# Patient Record
Sex: MALE | Race: ASIAN | NOT HISPANIC OR LATINO | Employment: FULL TIME | URBAN - METROPOLITAN AREA
[De-identification: names, ages, dates, MRNs, and addresses within clinical notes are randomized per-mention and may not be internally consistent; named-entity substitution may affect disease eponyms.]

---

## 2017-01-24 ENCOUNTER — HOSPITAL ENCOUNTER (OUTPATIENT)
Dept: OTHER | Facility: HOSPITAL | Age: 57
Setting detail: SPECIMEN
Discharge: HOME | End: 2017-01-24
Attending: FAMILY MEDICINE | Admitting: FAMILY MEDICINE

## 2017-01-24 LAB
ALBUMIN SERPL-MCNC: 4.4 G/DL (ref 3.5–4.8)
ALBUMIN/GLOB SERPL: 1.2 {RATIO} (ref 1–1.7)
ALP SERPL-CCNC: 79 IU/L (ref 32–91)
ALT SERPL-CCNC: 49 IU/L (ref 17–63)
ANION GAP SERPL CALC-SCNC: 15.1 MMOL/L (ref 10–20)
AST SERPL-CCNC: 35 IU/L (ref 15–41)
BASOPHILS # BLD AUTO: 0.1 10*3/UL (ref 0–0.2)
BASOPHILS NFR BLD AUTO: 1 % (ref 0–2)
BILIRUB SERPL-MCNC: 0.7 MG/DL (ref 0.3–1.2)
BUN SERPL-MCNC: 13 MG/DL (ref 8–20)
BUN/CREAT SERPL: 10.8 (ref 6.2–20.3)
CALCIUM SERPL-MCNC: 9.5 MG/DL (ref 8.9–10.3)
CHLORIDE SERPL-SCNC: 107 MMOL/L (ref 101–111)
CHOLEST SERPL-MCNC: 191 MG/DL
CHOLEST/HDLC SERPL: 5.4 {RATIO}
CONV CO2: 26 MMOL/L (ref 22–32)
CONV LDL CHOLESTEROL DIRECT: 114 MG/DL (ref 0–100)
CONV TOTAL PROTEIN: 8.1 G/DL (ref 6.1–7.9)
CREAT UR-MCNC: 1.2 MG/DL (ref 0.7–1.2)
DIFFERENTIAL METHOD BLD: (no result)
EOSINOPHIL # BLD AUTO: 0.2 10*3/UL (ref 0–0.3)
EOSINOPHIL # BLD AUTO: 4 % (ref 0–3)
ERYTHROCYTE [DISTWIDTH] IN BLOOD BY AUTOMATED COUNT: 13.4 % (ref 11.5–14.5)
GLOBULIN UR ELPH-MCNC: 3.7 G/DL (ref 2.5–3.8)
GLUCOSE SERPL-MCNC: 120 MG/DL (ref 65–99)
HCT VFR BLD AUTO: 49.5 % (ref 40–54)
HDLC SERPL-MCNC: 36 MG/DL
HGB BLD-MCNC: 16.5 G/DL (ref 14–18)
LDLC/HDLC SERPL: 3.2 {RATIO}
LIPID INTERPRETATION: ABNORMAL
LYMPHOCYTES # BLD AUTO: 1.7 10*3/UL (ref 0.8–4.8)
LYMPHOCYTES NFR BLD AUTO: 27 % (ref 18–42)
MCH RBC QN AUTO: 29.1 PG (ref 26–32)
MCHC RBC AUTO-ENTMCNC: 33.2 G/DL (ref 32–36)
MCV RBC AUTO: 87.5 FL (ref 80–94)
MONOCYTES # BLD AUTO: 0.5 10*3/UL (ref 0.1–1.3)
MONOCYTES NFR BLD AUTO: 7 % (ref 2–11)
NEUTROPHILS # BLD AUTO: 4 10*3/UL (ref 2.3–8.6)
NEUTROPHILS NFR BLD AUTO: 61 % (ref 50–75)
NRBC BLD AUTO-RTO: 0 /100{WBCS}
NRBC/RBC NFR BLD MANUAL: 0 10*3/UL
PLATELET # BLD AUTO: 189 10*3/UL (ref 150–450)
PMV BLD AUTO: 10.1 FL (ref 7.4–10.4)
POTASSIUM SERPL-SCNC: 4.1 MMOL/L (ref 3.6–5.1)
RBC # BLD AUTO: 5.66 10*6/UL (ref 4.6–6)
SODIUM SERPL-SCNC: 144 MMOL/L (ref 136–144)
TRIGL SERPL-MCNC: 226 MG/DL
TSH SERPL-ACNC: 1.83 UIU/ML (ref 0.34–5.6)
VLDLC SERPL CALC-MCNC: 41.9 MG/DL
WBC # BLD AUTO: 6.5 10*3/UL (ref 4.5–11.5)

## 2018-12-24 ENCOUNTER — HOSPITAL ENCOUNTER (OUTPATIENT)
Dept: OTHER | Facility: HOSPITAL | Age: 58
Setting detail: SPECIMEN
Discharge: HOME | End: 2018-12-24
Attending: FAMILY MEDICINE | Admitting: FAMILY MEDICINE

## 2018-12-25 LAB
ALBUMIN SERPL-MCNC: 3.9 G/DL (ref 3.5–4.8)
ALBUMIN/GLOB SERPL: 1.1 {RATIO} (ref 1–1.7)
ALP SERPL-CCNC: 84 IU/L (ref 32–91)
ALT SERPL-CCNC: 65 IU/L (ref 17–63)
ANION GAP SERPL CALC-SCNC: 13.2 MMOL/L (ref 10–20)
AST SERPL-CCNC: 38 IU/L (ref 15–41)
BASOPHILS # BLD AUTO: 0 10*3/UL (ref 0–0.2)
BASOPHILS NFR BLD AUTO: 1 % (ref 0–2)
BILIRUB SERPL-MCNC: 0.5 MG/DL (ref 0.3–1.2)
BUN SERPL-MCNC: 15 MG/DL (ref 8–20)
BUN/CREAT SERPL: 15 (ref 6.2–20.3)
CALCIUM SERPL-MCNC: 9 MG/DL (ref 8.9–10.3)
CHLORIDE SERPL-SCNC: 105 MMOL/L (ref 101–111)
CHOLEST SERPL-MCNC: 176 MG/DL
CHOLEST/HDLC SERPL: 5.1 {RATIO}
CONV CO2: 24 MMOL/L (ref 22–32)
CONV LDL CHOLESTEROL DIRECT: 108 MG/DL (ref 0–100)
CONV TOTAL PROTEIN: 7.3 G/DL (ref 6.1–7.9)
CREAT UR-MCNC: 1 MG/DL (ref 0.7–1.2)
DIFFERENTIAL METHOD BLD: (no result)
EOSINOPHIL # BLD AUTO: 0.1 10*3/UL (ref 0–0.3)
EOSINOPHIL # BLD AUTO: 3 % (ref 0–3)
ERYTHROCYTE [DISTWIDTH] IN BLOOD BY AUTOMATED COUNT: 13.3 % (ref 11.5–14.5)
GLOBULIN UR ELPH-MCNC: 3.4 G/DL (ref 2.5–3.8)
GLUCOSE SERPL-MCNC: 117 MG/DL (ref 65–99)
HCT VFR BLD AUTO: 46.4 % (ref 40–54)
HDLC SERPL-MCNC: 35 MG/DL
HGB BLD-MCNC: 15.5 G/DL (ref 14–18)
LDLC/HDLC SERPL: 3.1 {RATIO}
LIPID INTERPRETATION: ABNORMAL
LYMPHOCYTES # BLD AUTO: 1.3 10*3/UL (ref 0.8–4.8)
LYMPHOCYTES NFR BLD AUTO: 25 % (ref 18–42)
MCH RBC QN AUTO: 29.2 PG (ref 26–32)
MCHC RBC AUTO-ENTMCNC: 33.4 G/DL (ref 32–36)
MCV RBC AUTO: 87.4 FL (ref 80–94)
MONOCYTES # BLD AUTO: 0.4 10*3/UL (ref 0.1–1.3)
MONOCYTES NFR BLD AUTO: 8 % (ref 2–11)
NEUTROPHILS # BLD AUTO: 3.1 10*3/UL (ref 2.3–8.6)
NEUTROPHILS NFR BLD AUTO: 63 % (ref 50–75)
NRBC BLD AUTO-RTO: 0 /100{WBCS}
NRBC/RBC NFR BLD MANUAL: 0 10*3/UL
PLATELET # BLD AUTO: 173 10*3/UL (ref 150–450)
PMV BLD AUTO: 9.2 FL (ref 7.4–10.4)
POTASSIUM SERPL-SCNC: 4.2 MMOL/L (ref 3.6–5.1)
PSA SERPL-MCNC: 0.49 NG/ML (ref 0–4)
RBC # BLD AUTO: 5.31 10*6/UL (ref 4.6–6)
SODIUM SERPL-SCNC: 138 MMOL/L (ref 136–144)
T3FREE SERPL-MCNC: 3.01 PG/ML (ref 2.39–6.79)
T4 SERPL-MCNC: 7.09 UG/DL (ref 6.1–12.2)
TRIGL SERPL-MCNC: 203 MG/DL
TSH SERPL-ACNC: 1.82 UIU/ML (ref 0.34–5.6)
VLDLC SERPL CALC-MCNC: 33.8 MG/DL
WBC # BLD AUTO: 5 10*3/UL (ref 4.5–11.5)

## 2018-12-26 LAB — HBA1C MFR BLD: 6.5 % (ref 0–5.6)

## 2019-01-24 ENCOUNTER — HOSPITAL ENCOUNTER (OUTPATIENT)
Dept: OTHER | Facility: HOSPITAL | Age: 59
Setting detail: SPECIMEN
Discharge: HOME | End: 2019-01-24
Attending: FAMILY MEDICINE | Admitting: FAMILY MEDICINE

## 2019-07-16 RX ORDER — SIMVASTATIN 20 MG
TABLET ORAL
COMMUNITY
Start: 2018-07-19 | End: 2019-07-18 | Stop reason: SDUPTHER

## 2019-07-16 RX ORDER — LISINOPRIL 10 MG/1
TABLET ORAL EVERY 24 HOURS
COMMUNITY
Start: 2017-09-14 | End: 2019-07-18 | Stop reason: SDUPTHER

## 2019-07-17 ENCOUNTER — TELEPHONE (OUTPATIENT)
Dept: FAMILY MEDICINE CLINIC | Facility: CLINIC | Age: 59
End: 2019-07-17

## 2019-07-17 NOTE — TELEPHONE ENCOUNTER
07/17 PT WALKED IN PROVIDED [phone] RX NUMBER FOR YOU TO MAKE SURE HE GETS MEDS THRU [facility]. Mentioned he left message for you to please call him back. Thanks  [email or web address]

## 2019-07-19 ENCOUNTER — TELEPHONE (OUTPATIENT)
Dept: FAMILY MEDICINE CLINIC | Facility: CLINIC | Age: 59
End: 2019-07-19

## 2019-07-19 RX ORDER — SIMVASTATIN 20 MG
20 TABLET ORAL NIGHTLY
Qty: 30 TABLET | Refills: 2 | Status: SHIPPED | OUTPATIENT
Start: 2019-07-19 | End: 2019-07-21 | Stop reason: SDUPTHER

## 2019-07-19 RX ORDER — LISINOPRIL 10 MG/1
10 TABLET ORAL EVERY 24 HOURS
Qty: 30 TABLET | Refills: 2 | Status: SHIPPED | OUTPATIENT
Start: 2019-07-19 | End: 2019-07-21 | Stop reason: SDUPTHER

## 2019-07-21 RX ORDER — LISINOPRIL 10 MG/1
10 TABLET ORAL EVERY 24 HOURS
Qty: 90 TABLET | Refills: 1 | Status: SHIPPED | OUTPATIENT
Start: 2019-07-21 | End: 2019-07-22 | Stop reason: SDUPTHER

## 2019-07-21 RX ORDER — SIMVASTATIN 20 MG
20 TABLET ORAL NIGHTLY
Qty: 90 TABLET | Refills: 1 | Status: SHIPPED | OUTPATIENT
Start: 2019-07-21 | End: 2019-07-22 | Stop reason: SDUPTHER

## 2019-07-22 RX ORDER — SIMVASTATIN 20 MG
20 TABLET ORAL NIGHTLY
Qty: 90 TABLET | Refills: 1 | Status: SHIPPED | OUTPATIENT
Start: 2019-07-22 | End: 2019-07-23 | Stop reason: SDUPTHER

## 2019-07-22 RX ORDER — LISINOPRIL 10 MG/1
10 TABLET ORAL EVERY 24 HOURS
Qty: 90 TABLET | Refills: 1 | Status: SHIPPED | OUTPATIENT
Start: 2019-07-22 | End: 2019-07-23

## 2019-07-23 ENCOUNTER — OFFICE VISIT (OUTPATIENT)
Dept: FAMILY MEDICINE CLINIC | Facility: CLINIC | Age: 59
End: 2019-07-23

## 2019-07-23 VITALS
SYSTOLIC BLOOD PRESSURE: 142 MMHG | HEART RATE: 59 BPM | WEIGHT: 242.6 LBS | OXYGEN SATURATION: 97 % | DIASTOLIC BLOOD PRESSURE: 90 MMHG | BODY MASS INDEX: 35.93 KG/M2 | HEIGHT: 69 IN

## 2019-07-23 DIAGNOSIS — I10 ESSENTIAL HYPERTENSION: Primary | ICD-10-CM

## 2019-07-23 DIAGNOSIS — R73.09 ABNORMAL GLUCOSE LEVEL: ICD-10-CM

## 2019-07-23 DIAGNOSIS — E78.2 MULTIPLE-TYPE HYPERLIPIDEMIA: ICD-10-CM

## 2019-07-23 PROBLEM — N40.0 BENIGN PROSTATIC HYPERPLASIA: Status: ACTIVE | Noted: 2018-12-24

## 2019-07-23 PROBLEM — D22.9 NEVUS, ATYPICAL: Status: ACTIVE | Noted: 2018-12-24

## 2019-07-23 PROCEDURE — 99213 OFFICE O/P EST LOW 20 MIN: CPT | Performed by: FAMILY MEDICINE

## 2019-07-23 RX ORDER — SIMVASTATIN 20 MG
20 TABLET ORAL NIGHTLY
Qty: 90 TABLET | Refills: 1 | Status: SHIPPED | OUTPATIENT
Start: 2019-07-23 | End: 2020-02-10 | Stop reason: SDUPTHER

## 2019-07-23 RX ORDER — LISINOPRIL 20 MG/1
TABLET ORAL
Qty: 90 TABLET | Refills: 1 | Status: SHIPPED | OUTPATIENT
Start: 2019-07-23 | End: 2020-02-10 | Stop reason: SDUPTHER

## 2019-07-23 NOTE — PATIENT INSTRUCTIONS
Will increase Lisinopril to 20 mg q d w periodic BP Ck.  Cont Simvastatin 20 mg q d.  Add Cinnamon 1000 mg bid w meals and periodic BS Ck.  Cont HBP/HLD/DM Diet w Exercise and Wt Loss.  PCP F/U prn or in 6 mo. for Annual Exam and Labs.
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
## 2019-07-25 RX ORDER — LISINOPRIL 20 MG/1
TABLET ORAL
Qty: 90 TABLET | Refills: 1 | Status: CANCELLED | OUTPATIENT
Start: 2019-07-25

## 2019-07-26 ENCOUNTER — TELEPHONE (OUTPATIENT)
Dept: FAMILY MEDICINE CLINIC | Facility: CLINIC | Age: 59
End: 2019-07-26

## 2019-07-26 NOTE — TELEPHONE ENCOUNTER
Dose was increased from 10 mg to 20 mg. New eRx sent and PW filled out and signed for the 20 mg. Thanks.

## 2020-02-11 RX ORDER — SIMVASTATIN 20 MG
20 TABLET ORAL NIGHTLY
Qty: 90 TABLET | Refills: 0 | Status: SHIPPED | OUTPATIENT
Start: 2020-02-11 | End: 2020-04-23 | Stop reason: SDUPTHER

## 2020-02-11 RX ORDER — LISINOPRIL 20 MG/1
TABLET ORAL
Qty: 90 TABLET | Refills: 0 | Status: SHIPPED | OUTPATIENT
Start: 2020-02-11 | End: 2020-04-23 | Stop reason: SDUPTHER

## 2020-04-27 ENCOUNTER — TELEPHONE (OUTPATIENT)
Dept: FAMILY MEDICINE CLINIC | Facility: CLINIC | Age: 60
End: 2020-04-27

## 2020-04-27 RX ORDER — SIMVASTATIN 20 MG
20 TABLET ORAL NIGHTLY
Qty: 90 TABLET | Refills: 0 | Status: SHIPPED | OUTPATIENT
Start: 2020-04-27 | End: 2020-07-28 | Stop reason: SDUPTHER

## 2020-04-27 RX ORDER — LISINOPRIL 20 MG/1
TABLET ORAL
Qty: 90 TABLET | Refills: 0 | Status: SHIPPED | OUTPATIENT
Start: 2020-04-27 | End: 2020-04-30 | Stop reason: SDUPTHER

## 2020-04-27 NOTE — TELEPHONE ENCOUNTER
All strengths of lisinopril are backordered at patient's pharmacy.  Is there something else you can prescribe for the patient?

## 2020-04-28 NOTE — TELEPHONE ENCOUNTER
Can you please call patient to establish care with Yadi?  If he has an appt, can you move it up?  We can't refill his medication until he is seen.  Thank you.

## 2020-04-28 NOTE — TELEPHONE ENCOUNTER
I have never seen pt, he should try another pharmacy. No change until I see pt and know how bp is running.

## 2020-04-29 NOTE — TELEPHONE ENCOUNTER
Moved pat to tomorrow, but had to leave .  Ask him on  to call and confirm this will work.  Thanks bm

## 2020-04-30 ENCOUNTER — OFFICE VISIT (OUTPATIENT)
Dept: FAMILY MEDICINE CLINIC | Facility: CLINIC | Age: 60
End: 2020-04-30

## 2020-04-30 VITALS
WEIGHT: 243.2 LBS | HEART RATE: 74 BPM | OXYGEN SATURATION: 96 % | SYSTOLIC BLOOD PRESSURE: 142 MMHG | BODY MASS INDEX: 36.02 KG/M2 | TEMPERATURE: 98.2 F | DIASTOLIC BLOOD PRESSURE: 94 MMHG | HEIGHT: 69 IN

## 2020-04-30 DIAGNOSIS — I10 ESSENTIAL HYPERTENSION: ICD-10-CM

## 2020-04-30 DIAGNOSIS — Z00.00 PREVENTATIVE HEALTH CARE: ICD-10-CM

## 2020-04-30 DIAGNOSIS — Z12.5 PROSTATE CANCER SCREENING: ICD-10-CM

## 2020-04-30 DIAGNOSIS — Z23 NEED FOR TDAP VACCINATION: ICD-10-CM

## 2020-04-30 DIAGNOSIS — R53.83 OTHER FATIGUE: ICD-10-CM

## 2020-04-30 DIAGNOSIS — R06.83 SNORING: Primary | ICD-10-CM

## 2020-04-30 DIAGNOSIS — E78.2 MIXED HYPERLIPIDEMIA: ICD-10-CM

## 2020-04-30 LAB
DEPRECATED RDW RBC AUTO: 42.4 FL (ref 37–54)
ERYTHROCYTE [DISTWIDTH] IN BLOOD BY AUTOMATED COUNT: 13 % (ref 12.3–15.4)
HCT VFR BLD AUTO: 44.9 % (ref 37.5–51)
HGB BLD-MCNC: 15 G/DL (ref 13–17.7)
MCH RBC QN AUTO: 29.4 PG (ref 26.6–33)
MCHC RBC AUTO-ENTMCNC: 33.4 G/DL (ref 31.5–35.7)
MCV RBC AUTO: 88 FL (ref 79–97)
PLATELET # BLD AUTO: 174 10*3/MM3 (ref 140–450)
PMV BLD AUTO: 12 FL (ref 6–12)
RBC # BLD AUTO: 5.1 10*6/MM3 (ref 4.14–5.8)
WBC NRBC COR # BLD: 6.75 10*3/MM3 (ref 3.4–10.8)

## 2020-04-30 PROCEDURE — 85027 COMPLETE CBC AUTOMATED: CPT | Performed by: NURSE PRACTITIONER

## 2020-04-30 PROCEDURE — 84443 ASSAY THYROID STIM HORMONE: CPT | Performed by: NURSE PRACTITIONER

## 2020-04-30 PROCEDURE — 80061 LIPID PANEL: CPT | Performed by: NURSE PRACTITIONER

## 2020-04-30 PROCEDURE — 80053 COMPREHEN METABOLIC PANEL: CPT | Performed by: NURSE PRACTITIONER

## 2020-04-30 PROCEDURE — 90715 TDAP VACCINE 7 YRS/> IM: CPT | Performed by: NURSE PRACTITIONER

## 2020-04-30 PROCEDURE — 90471 IMMUNIZATION ADMIN: CPT | Performed by: NURSE PRACTITIONER

## 2020-04-30 PROCEDURE — G0103 PSA SCREENING: HCPCS | Performed by: NURSE PRACTITIONER

## 2020-04-30 PROCEDURE — 99214 OFFICE O/P EST MOD 30 MIN: CPT | Performed by: NURSE PRACTITIONER

## 2020-04-30 PROCEDURE — 83036 HEMOGLOBIN GLYCOSYLATED A1C: CPT | Performed by: NURSE PRACTITIONER

## 2020-04-30 PROCEDURE — 83721 ASSAY OF BLOOD LIPOPROTEIN: CPT | Performed by: NURSE PRACTITIONER

## 2020-04-30 RX ORDER — LISINOPRIL 20 MG/1
TABLET ORAL
Qty: 90 TABLET | Refills: 0 | Status: SHIPPED | OUTPATIENT
Start: 2020-04-30 | End: 2020-10-30 | Stop reason: SDUPTHER

## 2020-04-30 NOTE — PROGRESS NOTES
Chief Complaint   Patient presents with   • Establish Care     Had colonoscopy 4-5 years ago, need tdap because he stepped on thumb tack, wants to discuss sleep apnea test       HPI    HTN, borderline bp today, feels stable on meds and takes as directed, denies chest pain, headache, shortness of air, palpitations and swelling of extremities.     Hyperlipidemia, The patient denies muscle aches, constipation, diarrhea, GI upset and fatigue.  Patient denies  chest pain/pressure, exercise intolerance, dyspnea, palpitations, syncope and pedal edema.      Snoring, concerned with sleep apnea, pt reports severe daytime fatigue, takes a nap almost daily, witnessed apnea reported by spouse, pt startles, and starts breathing again    History reviewed. No pertinent past medical history.  History reviewed. No pertinent surgical history.  History reviewed. No pertinent family history.  Social History     Tobacco Use   • Smoking status: Never Smoker   Substance Use Topics   • Alcohol use: Not on file         Current Outpatient Medications:   •  lisinopril (PRINIVIL,ZESTRIL) 20 MG tablet, Take one tablet by mouth daily, Disp: 90 tablet, Rfl: 0  •  simvastatin (ZOCOR) 20 MG tablet, Take 1 tablet by mouth Every Night., Disp: 90 tablet, Rfl: 0    The following portions of the patient's history were reviewed and updated as appropriate: allergies, current medications, past family history, past medical history, past social history, past surgical history and problem list.    Review of Systems   Constitutional: Negative for chills, fatigue and fever.   HENT: Negative for dental problem, ear pain, sinus pressure and sore throat.    Eyes: Negative for visual disturbance.   Respiratory: Negative for cough, shortness of breath and wheezing.    Gastrointestinal: Negative for abdominal pain, blood in stool, constipation, diarrhea, nausea, vomiting and GERD.   Genitourinary: Negative for difficulty urinating, frequency, urgency and urinary  "incontinence.   Musculoskeletal: Negative for arthralgias, back pain, gait problem, joint swelling, myalgias and neck pain.   Skin: Negative for dry skin, pallor and rash.   Neurological: Negative for dizziness, seizures, speech difficulty and weakness.   Hematological: Negative for adenopathy.   Psychiatric/Behavioral: Negative for sleep disturbance, depressed mood and stress. The patient is not nervous/anxious.         Vitals:    04/30/20 1506   BP: 142/94   BP Location: Right arm   Patient Position: Sitting   Cuff Size: Large Adult   Pulse: 74   Temp: 98.2 °F (36.8 °C)   SpO2: 96%   Weight: 110 kg (243 lb 3.2 oz)   Height: 175.3 cm (69.02\")     Body mass index is 35.9 kg/m².     Physical Exam   Constitutional: He is oriented to person, place, and time. He appears well-developed and well-nourished. No distress.   HENT:   Head: Normocephalic and atraumatic.   Eyes: Pupils are equal, round, and reactive to light.   Neck: Normal range of motion. No thyromegaly present.   Cardiovascular: Normal rate, regular rhythm, normal heart sounds and intact distal pulses.   Pulmonary/Chest: Effort normal and breath sounds normal. No respiratory distress.   Abdominal: Soft. Bowel sounds are normal. He exhibits no distension. There is no tenderness.   Musculoskeletal: Normal range of motion.   Neurological: He is alert and oriented to person, place, and time.   Skin: Skin is warm and dry. He is not diaphoretic. No erythema.   Psychiatric: He has a normal mood and affect. His behavior is normal. Judgment and thought content normal.   Nursing note and vitals reviewed.       No visits with results within 7 Day(s) from this visit.   Latest known visit with results is:   No results found for any previous visit.       Diagnoses and all orders for this visit:    1. Snoring (Primary)  -     Home Sleep Study; Future  Check home sleep study, rule out obstructive sleep apnea.   will notify patient results and order CPAP if indicated.    2. " Essential hypertension  -     Lipid Panel  -     Comprehensive Metabolic Panel  -     CBC (No Diff)  -Borderline elevated, refill lisinopril for this 90 days to local [facility] due to [facility] order [facility] having lisinopril on back order.  -Discussed with patient to check blood pressure occasionally outside of the office and notify if consistently greater than 150 SBP    3. Preventative health care  -     TSH  -     Hemoglobin A1c     Colonoscopy up-to-date, due 2025  Flu vaccine: Up-to-date yearly  Discussed/recommended shingles vaccines  Update Tdap today    4. Prostate cancer screening  -     PSA Screen    5. Other fatigue  Check home sleep study, rule out obstructive sleep apnea    6. Need for Tdap vaccination  Stepped on thumbtack, given tdap today    7. Mixed hyperlipidemia  Continue, recently refilled simvastatin to [facility].  Labs today and will notify patient results    Other orders  -     lisinopril (PRINIVIL,ZESTRIL) 20 MG tablet; Take one tablet by mouth daily  Dispense: 90 tablet; Refill: 0         Collect labs as above, notify patient results once received   Refill simvastatin and lisinopril next time to [facility] mail order pharmacy  We will plan to see patient back in 6 months or earlier if needed

## 2020-05-01 LAB
ALBUMIN SERPL-MCNC: 4.2 G/DL (ref 3.5–5.2)
ALBUMIN/GLOB SERPL: 1.2 G/DL
ALP SERPL-CCNC: 98 U/L (ref 39–117)
ALT SERPL W P-5'-P-CCNC: 119 U/L (ref 1–41)
ANION GAP SERPL CALCULATED.3IONS-SCNC: 14.2 MMOL/L (ref 5–15)
ARTICHOKE IGE QN: 104 MG/DL (ref 0–100)
AST SERPL-CCNC: 88 U/L (ref 1–40)
BILIRUB SERPL-MCNC: 0.4 MG/DL (ref 0.2–1.2)
BUN BLD-MCNC: 16 MG/DL (ref 8–23)
BUN/CREAT SERPL: 17.8 (ref 7–25)
CALCIUM SPEC-SCNC: 9.5 MG/DL (ref 8.6–10.5)
CHLORIDE SERPL-SCNC: 101 MMOL/L (ref 98–107)
CHOLEST SERPL-MCNC: 185 MG/DL (ref 0–200)
CO2 SERPL-SCNC: 22.8 MMOL/L (ref 22–29)
CREAT BLD-MCNC: 0.9 MG/DL (ref 0.76–1.27)
GFR SERPL CREATININE-BSD FRML MDRD: 104 ML/MIN/1.73
GFR SERPL CREATININE-BSD FRML MDRD: 86 ML/MIN/1.73
GLOBULIN UR ELPH-MCNC: 3.6 GM/DL
GLUCOSE BLD-MCNC: 238 MG/DL (ref 65–99)
HBA1C MFR BLD: 7.8 % (ref 3.5–5.6)
HDLC SERPL-MCNC: 30 MG/DL (ref 40–60)
LDLC SERPL CALC-MCNC: ABNORMAL MG/DL
LDLC/HDLC SERPL: ABNORMAL {RATIO}
POTASSIUM BLD-SCNC: 4 MMOL/L (ref 3.5–5.2)
PROT SERPL-MCNC: 7.8 G/DL (ref 6–8.5)
PSA SERPL-MCNC: 0.36 NG/ML (ref 0–4)
SODIUM BLD-SCNC: 138 MMOL/L (ref 136–145)
TRIGL SERPL-MCNC: 478 MG/DL (ref 0–150)
TSH SERPL DL<=0.05 MIU/L-ACNC: 1.85 UIU/ML (ref 0.27–4.2)
VLDLC SERPL-MCNC: ABNORMAL MG/DL

## 2020-07-28 RX ORDER — SIMVASTATIN 20 MG
20 TABLET ORAL NIGHTLY
Qty: 90 TABLET | Refills: 0 | Status: SHIPPED | OUTPATIENT
Start: 2020-07-28 | End: 2020-10-26

## 2020-10-26 RX ORDER — SIMVASTATIN 20 MG
TABLET ORAL
Qty: 90 TABLET | Refills: 0 | Status: SHIPPED | OUTPATIENT
Start: 2020-10-26 | End: 2021-01-25

## 2020-10-26 NOTE — TELEPHONE ENCOUNTER
Patient has not followed up to discuss and/or start medication for hemoglobin A1c of 7.8, please schedule appointment and repeat diabetic panel fasting 1 week prior to that appointment.

## 2020-10-30 ENCOUNTER — OFFICE VISIT (OUTPATIENT)
Dept: FAMILY MEDICINE CLINIC | Facility: CLINIC | Age: 60
End: 2020-10-30

## 2020-10-30 VITALS
OXYGEN SATURATION: 97 % | HEART RATE: 64 BPM | RESPIRATION RATE: 16 BRPM | TEMPERATURE: 97.3 F | WEIGHT: 244.8 LBS | DIASTOLIC BLOOD PRESSURE: 104 MMHG | SYSTOLIC BLOOD PRESSURE: 168 MMHG | HEIGHT: 69 IN | BODY MASS INDEX: 36.26 KG/M2

## 2020-10-30 DIAGNOSIS — I10 ESSENTIAL HYPERTENSION: Primary | ICD-10-CM

## 2020-10-30 DIAGNOSIS — E78.2 MULTIPLE-TYPE HYPERLIPIDEMIA: ICD-10-CM

## 2020-10-30 PROCEDURE — 99213 OFFICE O/P EST LOW 20 MIN: CPT | Performed by: NURSE PRACTITIONER

## 2020-10-30 RX ORDER — LISINOPRIL 40 MG/1
TABLET ORAL
Qty: 90 TABLET | Refills: 1 | Status: SHIPPED | OUTPATIENT
Start: 2020-10-30 | End: 2021-05-14 | Stop reason: SDUPTHER

## 2020-10-30 NOTE — PATIENT INSTRUCTIONS
"1. Check blood pressure 2-3 times weekly and record  2. Call office if greater than 160/90      Hypertension, Adult  High blood pressure (hypertension) is when the force of blood pumping through the arteries is too strong. The arteries are the blood vessels that carry blood from the heart throughout the body. Hypertension forces the heart to work harder to pump blood and may cause arteries to become narrow or stiff. Untreated or uncontrolled hypertension can cause a heart attack, heart failure, a stroke, kidney disease, and other problems.  A blood pressure reading consists of a higher number over a lower number. Ideally, your blood pressure should be below 120/80. The first (\"top\") number is called the systolic pressure. It is a measure of the pressure in your arteries as your heart beats. The second (\"bottom\") number is called the diastolic pressure. It is a measure of the pressure in your arteries as the heart relaxes.  What are the causes?  The exact cause of this condition is not known. There are some conditions that result in or are related to high blood pressure.  What increases the risk?  Some risk factors for high blood pressure are under your control. The following factors may make you more likely to develop this condition:  · Smoking.  · Having type 2 diabetes mellitus, high cholesterol, or both.  · Not getting enough exercise or physical activity.  · Being overweight.  · Having too much fat, sugar, calories, or salt (sodium) in your diet.  · Drinking too much alcohol.  Some risk factors for high blood pressure may be difficult or impossible to change. Some of these factors include:  · Having chronic kidney disease.  · Having a family history of high blood pressure.  · Age. Risk increases with age.  · Race. You may be at higher risk if you are .  · Gender. Men are at higher risk than women before age 45. After age 65, women are at higher risk than men.  · Having obstructive sleep " apnea.  · Stress.  What are the signs or symptoms?  High blood pressure may not cause symptoms. Very high blood pressure (hypertensive crisis) may cause:  · Headache.  · Anxiety.  · Shortness of breath.  · Nosebleed.  · Nausea and vomiting.  · Vision changes.  · Severe chest pain.  · Seizures.  How is this diagnosed?  This condition is diagnosed by measuring your blood pressure while you are seated, with your arm resting on a flat surface, your legs uncrossed, and your feet flat on the floor. The cuff of the blood pressure monitor will be placed directly against the skin of your upper arm at the level of your heart. It should be measured at least twice using the same arm. Certain conditions can cause a difference in blood pressure between your right and left arms.  Certain factors can cause blood pressure readings to be lower or higher than normal for a short period of time:  · When your blood pressure is higher when you are in a health care provider's office than when you are at home, this is called white coat hypertension. Most people with this condition do not need medicines.  · When your blood pressure is higher at home than when you are in a health care provider's office, this is called masked hypertension. Most people with this condition may need medicines to control blood pressure.  If you have a high blood pressure reading during one visit or you have normal blood pressure with other risk factors, you may be asked to:  · Return on a different day to have your blood pressure checked again.  · Monitor your blood pressure at home for 1 week or longer.  If you are diagnosed with hypertension, you may have other blood or imaging tests to help your health care provider understand your overall risk for other conditions.  How is this treated?  This condition is treated by making healthy lifestyle changes, such as eating healthy foods, exercising more, and reducing your alcohol intake. Your health care provider may  prescribe medicine if lifestyle changes are not enough to get your blood pressure under control, and if:  · Your systolic blood pressure is above 130.  · Your diastolic blood pressure is above 80.  Your personal target blood pressure may vary depending on your medical conditions, your age, and other factors.  Follow these instructions at home:  Eating and drinking    · Eat a diet that is high in fiber and potassium, and low in sodium, added sugar, and fat. An example eating plan is called the DASH (Dietary Approaches to Stop Hypertension) diet. To eat this way:  ? Eat plenty of fresh fruits and vegetables. Try to fill one half of your plate at each meal with fruits and vegetables.  ? Eat whole grains, such as whole-wheat pasta, brown rice, or whole-grain bread. Fill about one fourth of your plate with whole grains.  ? Eat or drink low-fat dairy products, such as skim milk or low-fat yogurt.  ? Avoid fatty cuts of meat, processed or cured meats, and poultry with skin. Fill about one fourth of your plate with lean proteins, such as fish, chicken without skin, beans, eggs, or tofu.  ? Avoid pre-made and processed foods. These tend to be higher in sodium, added sugar, and fat.  · Reduce your daily sodium intake. Most people with hypertension should eat less than 1,500 mg of sodium a day.  · Do not drink alcohol if:  ? Your health care provider tells you not to drink.  ? You are pregnant, may be pregnant, or are planning to become pregnant.  · If you drink alcohol:  ? Limit how much you use to:  § 0-1 drink a day for women.  § 0-2 drinks a day for men.  ? Be aware of how much alcohol is in your drink. In the [place] one drink equals one 12 oz bottle of beer (355 mL), one 5 oz glass of wine (148 mL), or one 1½ oz glass of hard liquor (44 mL).  Lifestyle    · Work with your health care provider to maintain a healthy body weight or to lose weight. Ask what an ideal weight is for you.  · Get at least 30 minutes of exercise  most days of the week. Activities may include walking, swimming, or biking.  · Include exercise to strengthen your muscles (resistance exercise), such as Pilates or lifting weights, as part of your weekly exercise routine. Try to do these types of exercises for 30 minutes at least 3 days a week.  · Do not use any products that contain nicotine or tobacco, such as cigarettes, e-cigarettes, and chewing tobacco. If you need help quitting, ask your health care provider.  · Monitor your blood pressure at home as told by your health care provider.  · Keep all follow-up visits as told by your health care provider. This is important.  Medicines  · Take over-the-counter and prescription medicines only as told by your health care provider. Follow directions carefully. Blood pressure medicines must be taken as prescribed.  · Do not skip doses of blood pressure medicine. Doing this puts you at risk for problems and can make the medicine less effective.  · Ask your health care provider about side effects or reactions to medicines that you should watch for.  Contact a health care provider if you:  · Think you are having a reaction to a medicine you are taking.  · Have headaches that keep coming back (recurring).  · Feel dizzy.  · Have swelling in your ankles.  · Have trouble with your vision.  Get help right away if you:  · Develop a severe headache or confusion.  · Have unusual weakness or numbness.  · Feel faint.  · Have severe pain in your chest or abdomen.  · Vomit repeatedly.  · Have trouble breathing.  Summary  · Hypertension is when the force of blood pumping through your arteries is too strong. If this condition is not controlled, it may put you at risk for serious complications.  · Your personal target blood pressure may vary depending on your medical conditions, your age, and other factors. For most people, a normal blood pressure is less than 120/80.  · Hypertension is treated with lifestyle changes, medicines, or a  combination of both. Lifestyle changes include losing weight, eating a healthy, low-sodium diet, exercising more, and limiting alcohol.  This information is not intended to replace advice given to you by your health care provider. Make sure you discuss any questions you have with your health care provider.  Document Released: 12/18/2006 Document Revised: 08/28/2019 Document Reviewed: 08/28/2019  Elsevier Patient Education © 2020 [employer]

## 2020-10-30 NOTE — ASSESSMENT & PLAN NOTE
1.  Patient has been out of lisinopril for 1 month but reports that even with the lisinopril 20 mg, BP was elevated  2.  Increase lisinopril to 40 mg daily  3.  Patient to follow no added salt diet  4.  Patient to check blood pressure 2-3 times weekly and record.  Call office if greater than 160/90 or if chest pain, headaches, blurred vision develop  5.  Patient to return in 1 month

## 2020-10-30 NOTE — PROGRESS NOTES
Chief Complaint   Patient presents with   • Hypertension   • Follow-up       Hypertension  This is a chronic problem. The current episode started more than 1 year ago. The problem is uncontrolled. Associated symptoms include headaches. Pertinent negatives include no blurred vision, chest pain or shortness of breath. Risk factors for coronary artery disease include obesity, male gender and dyslipidemia. Current antihypertension treatment includes ACE inhibitors. The current treatment provides mild improvement. Compliance problems: Patient has been out of medication x 1 month.    Hyperlipidemia  This is a chronic problem. The problem is uncontrolled. Exacerbating diseases include obesity. Pertinent negatives include no chest pain or shortness of breath. Current antihyperlipidemic treatment includes statins. The current treatment provides mild improvement of lipids. There are no compliance problems.  Risk factors for coronary artery disease include obesity and male sex.       The following portions of the patient's history were reviewed and updated as appropriate: allergies, current medications, past family history, past medical history, past social history, past surgical history and problem list.    History reviewed. No pertinent past medical history.  History reviewed. No pertinent surgical history.  History reviewed. No pertinent family history.  Social History     Tobacco Use   • Smoking status: Never Smoker   • Smokeless tobacco: Never Used   Substance Use Topics   • Alcohol use: Not on file         Current Outpatient Medications:   •  lisinopril (PRINIVIL,ZESTRIL) 40 MG tablet, Take one tablet by mouth daily, Disp: 90 tablet, Rfl: 1  •  simvastatin (ZOCOR) 20 MG tablet, TAKE 1 TABLET EVERY NIGHT, Disp: 90 tablet, Rfl: 0        Review of Systems   Constitutional: Negative for fever and unexpected weight loss.   HENT: Negative for sore throat.    Eyes: Negative for blurred vision.   Respiratory: Negative for cough,  "shortness of breath and wheezing.    Cardiovascular: Negative for chest pain and leg swelling.   Gastrointestinal: Negative for constipation, diarrhea, nausea and vomiting.   Genitourinary: Negative for dysuria, frequency and urgency.   Musculoskeletal: Negative for arthralgias and gait problem.   Skin: Negative for rash and skin lesions.   Neurological: Positive for headache. Negative for dizziness and weakness.   Psychiatric/Behavioral: Negative for depressed mood. The patient is not nervous/anxious.        Vitals:    10/30/20 1055 10/30/20 1112   BP: (!) 156/102 (!) 168/104   BP Location: Left arm    Patient Position: Sitting    Cuff Size: Large Adult    Pulse: 64    Resp: 16    Temp: 97.3 °F (36.3 °C)    TempSrc: Skin    SpO2: 97%    Weight: 111 kg (244 lb 12.8 oz)    Height: 175.3 cm (69\")      Body mass index is 36.15 kg/m².      PHQ-9 Depression Screening  Little interest or pleasure in doing things? 0   Feeling down, depressed, or hopeless? 0   Trouble falling or staying asleep, or sleeping too much? 0   Feeling tired or having little energy? 2   Poor appetite or overeating? 0   Feeling bad about yourself - or that you are a failure or have let yourself or your family down? 0   Trouble concentrating on things, such as reading the newspaper or watching television? 0   Moving or speaking so slowly that other people could have noticed? Or the opposite - being so fidgety or restless that you have been moving around a lot more than usual? 0   Thoughts that you would be better off dead, or of hurting yourself in some way? 0   PHQ-9 Total Score 2   If you checked off any problems, how difficult have these problems made it for you to do your work, take care of things at home, or get along with other people? @[facility](210  [id number])@        Physical Exam  Constitutional:       Appearance: Normal appearance.   HENT:      Head: Normocephalic.   Neck:      Musculoskeletal: Neck supple.   Cardiovascular:      Rate and " Rhythm: Normal rate and regular rhythm.   Pulmonary:      Effort: Pulmonary effort is normal.      Breath sounds: Normal breath sounds.   Abdominal:      General: Bowel sounds are normal.      Palpations: Abdomen is soft.   Musculoskeletal: Normal range of motion.      Right lower leg: No edema.      Left lower leg: No edema.   Skin:     General: Skin is warm and dry.   Neurological:      Mental Status: He is alert and oriented to person, place, and time.      Gait: Gait is intact.   Psychiatric:         Attention and Perception: Attention normal.         Mood and Affect: Mood normal.         Speech: Speech normal.         No visits with results within 7 Day(s) from this visit.   Latest known visit with results is:   Office Visit on 04/30/2020   Component Date Value Ref Range Status   • Total Cholesterol 04/30/2020 185  0 - 200 mg/dL Final   • Triglycerides 04/30/2020 478* 0 - 150 mg/dL Final   • HDL Cholesterol 04/30/2020 30* 40 - 60 mg/dL Final   • LDL Cholesterol  04/30/2020    Final    Unable to calculate   • VLDL Cholesterol 04/30/2020    Final    Unable to calculate   • LDL/HDL Ratio 04/30/2020    Final    Unable to calculate   • Glucose 04/30/2020 238* 65 - 99 mg/dL Final   • BUN 04/30/2020 16  8 - 23 mg/dL Final   • Creatinine 04/30/2020 0.90  0.76 - 1.27 mg/dL Final   • Sodium 04/30/2020 138  136 - 145 mmol/L Final   • Potassium 04/30/2020 4.0  3.5 - 5.2 mmol/L Final   • Chloride 04/30/2020 101  98 - 107 mmol/L Final   • CO2 04/30/2020 22.8  22.0 - 29.0 mmol/L Final   • Calcium 04/30/2020 9.5  8.6 - 10.5 mg/dL Final   • Total Protein 04/30/2020 7.8  6.0 - 8.5 g/dL Final   • Albumin 04/30/2020 4.20  3.50 - 5.20 g/dL Final   • ALT (SGPT) 04/30/2020 119* 1 - 41 U/L Final   • AST (SGOT) 04/30/2020 88* 1 - 40 U/L Final   • Alkaline Phosphatase 04/30/2020 98  39 - 117 U/L Final   • Total Bilirubin 04/30/2020 0.4  0.2 - 1.2 mg/dL Final   • eGFR Non African Amer 04/30/2020 86  >60 mL/min/1.73 Final   • eGFR    Amer 04/30/2020 104  >60 mL/min/1.73 Final   • Globulin 04/30/2020 3.6  gm/dL Final   • A/G Ratio 04/30/2020 1.2  g/dL Final   • BUN/Creatinine Ratio 04/30/2020 17.8  7.0 - 25.0 Final   • Anion Gap 04/30/2020 14.2  5.0 - 15.0 mmol/L Final   • WBC 04/30/2020 6.75  3.40 - 10.80 10*3/mm3 Final   • RBC 04/30/2020 5.10  4.14 - 5.80 10*6/mm3 Final   • Hemoglobin 04/30/2020 15.0  13.0 - 17.7 g/dL Final   • Hematocrit 04/30/2020 44.9  37.5 - 51.0 % Final   • MCV 04/30/2020 88.0  79.0 - 97.0 fL Final   • MCH 04/30/2020 29.4  26.6 - 33.0 pg Final   • MCHC 04/30/2020 33.4  31.5 - 35.7 g/dL Final   • RDW 04/30/2020 13.0  12.3 - 15.4 % Final   • RDW-SD 04/30/2020 42.4  37.0 - 54.0 fl Final   • MPV 04/30/2020 12.0  6.0 - 12.0 fL Final   • Platelets 04/30/2020 174  140 - 450 10*3/mm3 Final   • TSH 04/30/2020 1.850  0.270 - 4.200 uIU/mL Final    TSH results may be falsely decreased if patient taking Biotin.   • PSA 04/30/2020 0.360  0.000 - 4.000 ng/mL Final   • Hemoglobin A1C 04/30/2020 7.8* 3.5 - 5.6 % Final   • LDL Cholesterol  04/30/2020 104* 0 - 100 mg/dL Final       Problem List Items Addressed This Visit        Cardiovascular and Mediastinum    Hypertension - Primary    Current Assessment & Plan     1.  Patient has been out of lisinopril for 1 month but reports that even with the lisinopril 20 mg, BP was elevated  2.  Increase lisinopril to 40 mg daily  3.  Patient to follow no added salt diet  4.  Patient to check blood pressure 2-3 times weekly and record.  Call office if greater than 160/90 or if chest pain, headaches, blurred vision develop  5.  Patient to return in 1 month         Relevant Medications    lisinopril (PRINIVIL,ZESTRIL) 40 MG tablet    Multiple-type hyperlipidemia    Current Assessment & Plan     1.  Continue Zocor 20 mg daily  2.  Start fish oil 1 g daily

## 2020-11-25 ENCOUNTER — OFFICE VISIT (OUTPATIENT)
Dept: FAMILY MEDICINE CLINIC | Facility: CLINIC | Age: 60
End: 2020-11-25

## 2020-11-25 VITALS
SYSTOLIC BLOOD PRESSURE: 157 MMHG | HEIGHT: 69 IN | OXYGEN SATURATION: 96 % | TEMPERATURE: 97.5 F | HEART RATE: 64 BPM | BODY MASS INDEX: 36.97 KG/M2 | WEIGHT: 249.6 LBS | DIASTOLIC BLOOD PRESSURE: 104 MMHG

## 2020-11-25 DIAGNOSIS — E78.2 MULTIPLE-TYPE HYPERLIPIDEMIA: ICD-10-CM

## 2020-11-25 DIAGNOSIS — I10 ESSENTIAL HYPERTENSION: Primary | ICD-10-CM

## 2020-11-25 DIAGNOSIS — Z00.00 PREVENTATIVE HEALTH CARE: ICD-10-CM

## 2020-11-25 PROCEDURE — 99396 PREV VISIT EST AGE 40-64: CPT | Performed by: NURSE PRACTITIONER

## 2020-11-25 RX ORDER — AMLODIPINE BESYLATE 5 MG/1
5 TABLET ORAL DAILY
Qty: 90 TABLET | Refills: 1 | Status: SHIPPED | OUTPATIENT
Start: 2020-11-25 | End: 2020-12-23 | Stop reason: SDUPTHER

## 2020-11-25 NOTE — PROGRESS NOTES
Chief Complaint   Patient presents with   • Hypertension   • Hyperlipidemia   • Follow-up     Visit to f/u on hypertension and complete annual exam.    Hypertension  This is a chronic problem. The current episode started more than 1 year ago. The problem has been gradually improving since onset. The problem is uncontrolled. Pertinent negatives include no blurred vision, chest pain, headaches or shortness of breath. Current antihypertension treatment includes ACE inhibitors (Lisinopril increased to 40mg daily in October, patient reportedly taking as prescribed). The current treatment provides mild improvement.   Hyperlipidemia  This is a chronic problem. The current episode started more than 1 year ago. Recent lipid tests were reviewed and are high (last drawn April 2020, triglycerides elevated). Exacerbating diseases include obesity. There are no known factors aggravating his hyperlipidemia. Pertinent negatives include no chest pain or shortness of breath. Current antihyperlipidemic treatment includes statins (fish oil, started last month). There are no compliance problems.  Risk factors for coronary artery disease include male sex and obesity.       The following portions of the patient's history were reviewed and updated as appropriate: allergies, current medications, past family history, past medical history, past social history, past surgical history and problem list.    History reviewed. No pertinent past medical history.  History reviewed. No pertinent surgical history.  History reviewed. No pertinent family history.  Social History     Tobacco Use   • Smoking status: Never Smoker   • Smokeless tobacco: Never Used   Substance Use Topics   • Alcohol use: Not on file         Current Outpatient Medications:   •  lisinopril (PRINIVIL,ZESTRIL) 40 MG tablet, Take one tablet by mouth daily, Disp: 90 tablet, Rfl: 1  •  simvastatin (ZOCOR) 20 MG tablet, TAKE 1 TABLET EVERY NIGHT, Disp: 90 tablet, Rfl: 0  •  amLODIPine  "(Norvasc) 5 MG tablet, Take 1 tablet by mouth Daily for 90 days., Disp: 90 tablet, Rfl: 1        Review of Systems   Constitutional: Negative for fever and unexpected weight loss.   HENT: Negative for sore throat.    Eyes: Negative for blurred vision.   Respiratory: Negative for cough, shortness of breath and wheezing.    Cardiovascular: Negative for chest pain and leg swelling.   Gastrointestinal: Negative for constipation, diarrhea, nausea and vomiting.   Genitourinary: Negative for dysuria, frequency and urgency.   Musculoskeletal: Negative for arthralgias and gait problem.   Skin: Negative for rash and skin lesions.   Neurological: Negative for dizziness, weakness and headache.   Psychiatric/Behavioral: Negative for depressed mood. The patient is not nervous/anxious.        Vitals:    11/25/20 1322   BP: (!) 157/104   BP Location: Left arm   Patient Position: Sitting   Cuff Size: Adult   Pulse: 64   Temp: 97.5 °F (36.4 °C)   TempSrc: Skin   SpO2: 96%   Weight: 113 kg (249 lb 9.6 oz)   Height: 175.3 cm (69.02\")     Body mass index is 36.84 kg/m².      PHQ-9 Depression Screening  Little interest or pleasure in doing things?     Feeling down, depressed, or hopeless?     Trouble falling or staying asleep, or sleeping too much?     Feeling tired or having little energy?     Poor appetite or overeating?     Feeling bad about yourself - or that you are a failure or have let yourself or your family down?     Trouble concentrating on things, such as reading the newspaper or watching television?     Moving or speaking so slowly that other people could have noticed? Or the opposite - being so fidgety or restless that you have been moving around a lot more than usual?     Thoughts that you would be better off dead, or of hurting yourself in some way?     PHQ-9 Total Score     If you checked off any problems, how difficult have these problems made it for you to do your work, take care of things at home, or get along with " other people? @[facility](210  [id number])@        Physical Exam  Constitutional:       Appearance: Normal appearance.   HENT:      Head: Normocephalic.   Neck:      Musculoskeletal: Neck supple.   Cardiovascular:      Rate and Rhythm: Normal rate and regular rhythm.   Pulmonary:      Effort: Pulmonary effort is normal.      Breath sounds: Normal breath sounds.   Abdominal:      General: Bowel sounds are normal.      Palpations: Abdomen is soft.   Musculoskeletal: Normal range of motion.      Right lower leg: No edema.      Left lower leg: No edema.   Skin:     General: Skin is warm and dry.   Neurological:      Mental Status: He is alert and oriented to person, place, and time.      Gait: Gait is intact.   Psychiatric:         Attention and Perception: Attention normal.         Mood and Affect: Mood normal.         Speech: Speech normal.         No visits with results within 7 Day(s) from this visit.   Latest known visit with results is:   Office Visit on 04/30/2020   Component Date Value Ref Range Status   • Total Cholesterol 04/30/2020 185  0 - 200 mg/dL Final   • Triglycerides 04/30/2020 478* 0 - 150 mg/dL Final   • HDL Cholesterol 04/30/2020 30* 40 - 60 mg/dL Final   • LDL Cholesterol  04/30/2020    Final    Unable to calculate   • VLDL Cholesterol 04/30/2020    Final    Unable to calculate   • LDL/HDL Ratio 04/30/2020    Final    Unable to calculate   • Glucose 04/30/2020 238* 65 - 99 mg/dL Final   • BUN 04/30/2020 16  8 - 23 mg/dL Final   • Creatinine 04/30/2020 0.90  0.76 - 1.27 mg/dL Final   • Sodium 04/30/2020 138  136 - 145 mmol/L Final   • Potassium 04/30/2020 4.0  3.5 - 5.2 mmol/L Final   • Chloride 04/30/2020 101  98 - 107 mmol/L Final   • CO2 04/30/2020 22.8  22.0 - 29.0 mmol/L Final   • Calcium 04/30/2020 9.5  8.6 - 10.5 mg/dL Final   • Total Protein 04/30/2020 7.8  6.0 - 8.5 g/dL Final   • Albumin 04/30/2020 4.20  3.50 - 5.20 g/dL Final   • ALT (SGPT) 04/30/2020 119* 1 - 41 U/L Final   • AST (SGOT)  04/30/2020 88* 1 - 40 U/L Final   • Alkaline Phosphatase 04/30/2020 98  39 - 117 U/L Final   • Total Bilirubin 04/30/2020 0.4  0.2 - 1.2 mg/dL Final   • eGFR Non African Amer 04/30/2020 86  >60 mL/min/1.73 Final   • eGFR  African Amer 04/30/2020 104  >60 mL/min/1.73 Final   • Globulin 04/30/2020 3.6  gm/dL Final   • A/G Ratio 04/30/2020 1.2  g/dL Final   • BUN/Creatinine Ratio 04/30/2020 17.8  7.0 - 25.0 Final   • Anion Gap 04/30/2020 14.2  5.0 - 15.0 mmol/L Final   • WBC 04/30/2020 6.75  3.40 - 10.80 10*3/mm3 Final   • RBC 04/30/2020 5.10  4.14 - 5.80 10*6/mm3 Final   • Hemoglobin 04/30/2020 15.0  13.0 - 17.7 g/dL Final   • Hematocrit 04/30/2020 44.9  37.5 - 51.0 % Final   • MCV 04/30/2020 88.0  79.0 - 97.0 fL Final   • MCH 04/30/2020 29.4  26.6 - 33.0 pg Final   • MCHC 04/30/2020 33.4  31.5 - 35.7 g/dL Final   • RDW 04/30/2020 13.0  12.3 - 15.4 % Final   • RDW-SD 04/30/2020 42.4  37.0 - 54.0 fl Final   • MPV 04/30/2020 12.0  6.0 - 12.0 fL Final   • Platelets 04/30/2020 174  140 - 450 10*3/mm3 Final   • TSH 04/30/2020 1.850  0.270 - 4.200 uIU/mL Final    TSH results may be falsely decreased if patient taking Biotin.   • PSA 04/30/2020 0.360  0.000 - 4.000 ng/mL Final   • Hemoglobin A1C 04/30/2020 7.8* 3.5 - 5.6 % Final   • LDL Cholesterol  04/30/2020 104* 0 - 100 mg/dL Final       Problem List Items Addressed This Visit        Cardiovascular and Mediastinum    Hypertension - Primary    Current Assessment & Plan     1.  Continue lisinopril 40 mg  2.  Add Norvasc 5 mg  3.  Follow-up in 1 month         Relevant Medications    amLODIPine (Norvasc) 5 MG tablet    Other Relevant Orders    Basic Metabolic Panel    CBC & Differential    Lipid Panel    TSH    Multiple-type hyperlipidemia    Current Assessment & Plan     1.  Continue Zocor 20 mg daily  2.  Repeat fasting lipid panel before next follow-up visit            Other    Preventative health care    Current Assessment & Plan     1.  Hepatitis C screening ordered  2.   Discussed colonoscopy, patient not ready for referral.  His last reported colonoscopy was approximately 10 years ago, he is aware he is due for repeat scope         Relevant Orders    Hepatitis C Antibody       Return in about 4 weeks (around 12/23/2020).  Counseling was given to patient for the following topics: diagnostic results, patient and family education and importance of treatment compliance . Total time of the encounter was 20 minutes and 10minutes was spend counseling.

## 2020-11-25 NOTE — ASSESSMENT & PLAN NOTE
1.  Hepatitis C screening ordered  2.  Discussed colonoscopy, patient not ready for referral.  His last reported colonoscopy was approximately 10 years ago, he is aware he is due for repeat scope

## 2020-12-23 ENCOUNTER — OFFICE VISIT (OUTPATIENT)
Dept: FAMILY MEDICINE CLINIC | Facility: CLINIC | Age: 60
End: 2020-12-23

## 2020-12-23 VITALS
SYSTOLIC BLOOD PRESSURE: 167 MMHG | BODY MASS INDEX: 36.17 KG/M2 | OXYGEN SATURATION: 97 % | WEIGHT: 244.2 LBS | HEART RATE: 58 BPM | TEMPERATURE: 97.3 F | HEIGHT: 69 IN | DIASTOLIC BLOOD PRESSURE: 94 MMHG

## 2020-12-23 DIAGNOSIS — I10 ESSENTIAL HYPERTENSION: ICD-10-CM

## 2020-12-23 DIAGNOSIS — Z00.00 PREVENTATIVE HEALTH CARE: ICD-10-CM

## 2020-12-23 DIAGNOSIS — E78.2 MULTIPLE-TYPE HYPERLIPIDEMIA: Primary | ICD-10-CM

## 2020-12-23 PROCEDURE — 80061 LIPID PANEL: CPT | Performed by: NURSE PRACTITIONER

## 2020-12-23 PROCEDURE — 80048 BASIC METABOLIC PNL TOTAL CA: CPT | Performed by: NURSE PRACTITIONER

## 2020-12-23 PROCEDURE — 86803 HEPATITIS C AB TEST: CPT | Performed by: NURSE PRACTITIONER

## 2020-12-23 PROCEDURE — 36415 COLL VENOUS BLD VENIPUNCTURE: CPT | Performed by: NURSE PRACTITIONER

## 2020-12-23 PROCEDURE — 99213 OFFICE O/P EST LOW 20 MIN: CPT | Performed by: NURSE PRACTITIONER

## 2020-12-23 PROCEDURE — 84443 ASSAY THYROID STIM HORMONE: CPT | Performed by: NURSE PRACTITIONER

## 2020-12-23 PROCEDURE — 85025 COMPLETE CBC W/AUTO DIFF WBC: CPT | Performed by: NURSE PRACTITIONER

## 2020-12-23 RX ORDER — AMLODIPINE BESYLATE 10 MG/1
10 TABLET ORAL DAILY
Qty: 90 TABLET | Refills: 1 | Status: SHIPPED | OUTPATIENT
Start: 2020-12-23 | End: 2021-03-23

## 2020-12-23 NOTE — ASSESSMENT & PLAN NOTE
1.  Slight improvement in blood pressure  2.  Continue lisinopril 40 mg daily  3.  Increase Norvasc to 10 mg daily

## 2020-12-23 NOTE — PROGRESS NOTES
"Chief Complaint   Patient presents with   • Follow-up     4 week follow up. Pt stated he is also supposed to have labs done       Hypertension  This is a chronic problem. The current episode started more than 1 year ago. The problem has been gradually improving since onset. Pertinent negatives include no anxiety, blurred vision, chest pain or headaches. Current antihypertension treatment includes calcium channel blockers and ACE inhibitors (Norvasc 5mg added last visit). The current treatment provides mild improvement.       The following portions of the patient's history were reviewed and updated as appropriate: allergies, current medications, past family history, past medical history, past social history, past surgical history and problem list.    History reviewed. No pertinent past medical history.  History reviewed. No pertinent surgical history.  History reviewed. No pertinent family history.  Social History     Tobacco Use   • Smoking status: Never Smoker   • Smokeless tobacco: Never Used   Substance Use Topics   • Alcohol use: Not on file         Current Outpatient Medications:   •  amLODIPine (NORVASC) 10 MG tablet, Take 1 tablet by mouth Daily for 90 days., Disp: 90 tablet, Rfl: 1  •  lisinopril (PRINIVIL,ZESTRIL) 40 MG tablet, Take one tablet by mouth daily, Disp: 90 tablet, Rfl: 1  •  simvastatin (ZOCOR) 20 MG tablet, TAKE 1 TABLET EVERY NIGHT, Disp: 90 tablet, Rfl: 0        Review of Systems   Eyes: Negative for blurred vision.   Cardiovascular: Negative for chest pain.       Vitals:    12/23/20 0755   BP: 167/94   BP Location: Left arm   Patient Position: Sitting   Cuff Size: Large Adult   Pulse: 58   Temp: 97.3 °F (36.3 °C)   TempSrc: Skin   SpO2: 97%   Weight: 111 kg (244 lb 3.2 oz)   Height: 175.3 cm (69\")     Body mass index is 36.06 kg/m².      PHQ-9 Depression Screening  Little interest or pleasure in doing things?     Feeling down, depressed, or hopeless?     Trouble falling or staying asleep, or " sleeping too much?     Feeling tired or having little energy?     Poor appetite or overeating?     Feeling bad about yourself - or that you are a failure or have let yourself or your family down?     Trouble concentrating on things, such as reading the newspaper or watching television?     Moving or speaking so slowly that other people could have noticed? Or the opposite - being so fidgety or restless that you have been moving around a lot more than usual?     Thoughts that you would be better off dead, or of hurting yourself in some way?     PHQ-9 Total Score     If you checked off any problems, how difficult have these problems made it for you to do your work, take care of things at home, or get along with other people? @[facility](210  [id number])@        Physical Exam  Constitutional:       Appearance: Normal appearance.   HENT:      Head: Normocephalic.   Neck:      Musculoskeletal: Neck supple.   Cardiovascular:      Rate and Rhythm: Normal rate and regular rhythm.   Pulmonary:      Effort: Pulmonary effort is normal.      Breath sounds: Normal breath sounds.   Abdominal:      General: Abdomen is flat. Bowel sounds are normal.      Palpations: Abdomen is soft.   Musculoskeletal: Normal range of motion.      Right lower leg: No edema.      Left lower leg: No edema.   Skin:     General: Skin is warm and dry.   Neurological:      Mental Status: He is alert and oriented to person, place, and time.      Gait: Gait is intact.   Psychiatric:         Attention and Perception: Attention normal.         Mood and Affect: Mood normal.         Speech: Speech normal.         No visits with results within 7 Day(s) from this visit.   Latest known visit with results is:   Office Visit on 04/30/2020   Component Date Value Ref Range Status   • Total Cholesterol 04/30/2020 185  0 - 200 mg/dL Final   • Triglycerides 04/30/2020 478* 0 - 150 mg/dL Final   • HDL Cholesterol 04/30/2020 30* 40 - 60 mg/dL Final   • LDL Cholesterol   04/30/2020    Final    Unable to calculate   • VLDL Cholesterol 04/30/2020    Final    Unable to calculate   • LDL/HDL Ratio 04/30/2020    Final    Unable to calculate   • Glucose 04/30/2020 238* 65 - 99 mg/dL Final   • BUN 04/30/2020 16  8 - 23 mg/dL Final   • Creatinine 04/30/2020 0.90  0.76 - 1.27 mg/dL Final   • Sodium 04/30/2020 138  136 - 145 mmol/L Final   • Potassium 04/30/2020 4.0  3.5 - 5.2 mmol/L Final   • Chloride 04/30/2020 101  98 - 107 mmol/L Final   • CO2 04/30/2020 22.8  22.0 - 29.0 mmol/L Final   • Calcium 04/30/2020 9.5  8.6 - 10.5 mg/dL Final   • Total Protein 04/30/2020 7.8  6.0 - 8.5 g/dL Final   • Albumin 04/30/2020 4.20  3.50 - 5.20 g/dL Final   • ALT (SGPT) 04/30/2020 119* 1 - 41 U/L Final   • AST (SGOT) 04/30/2020 88* 1 - 40 U/L Final   • Alkaline Phosphatase 04/30/2020 98  39 - 117 U/L Final   • Total Bilirubin 04/30/2020 0.4  0.2 - 1.2 mg/dL Final   • eGFR Non African Amer 04/30/2020 86  >60 mL/min/1.73 Final   • eGFR  African Amer 04/30/2020 104  >60 mL/min/1.73 Final   • Globulin 04/30/2020 3.6  gm/dL Final   • A/G Ratio 04/30/2020 1.2  g/dL Final   • BUN/Creatinine Ratio 04/30/2020 17.8  7.0 - 25.0 Final   • Anion Gap 04/30/2020 14.2  5.0 - 15.0 mmol/L Final   • WBC 04/30/2020 6.75  3.40 - 10.80 10*3/mm3 Final   • RBC 04/30/2020 5.10  4.14 - 5.80 10*6/mm3 Final   • Hemoglobin 04/30/2020 15.0  13.0 - 17.7 g/dL Final   • Hematocrit 04/30/2020 44.9  37.5 - 51.0 % Final   • MCV 04/30/2020 88.0  79.0 - 97.0 fL Final   • MCH 04/30/2020 29.4  26.6 - 33.0 pg Final   • MCHC 04/30/2020 33.4  31.5 - 35.7 g/dL Final   • RDW 04/30/2020 13.0  12.3 - 15.4 % Final   • RDW-SD 04/30/2020 42.4  37.0 - 54.0 fl Final   • MPV 04/30/2020 12.0  6.0 - 12.0 fL Final   • Platelets 04/30/2020 174  140 - 450 10*3/mm3 Final   • TSH 04/30/2020 1.850  0.270 - 4.200 uIU/mL Final    TSH results may be falsely decreased if patient taking Biotin.   • PSA 04/30/2020 0.360  0.000 - 4.000 ng/mL Final   • Hemoglobin A1C  04/30/2020 7.8* 3.5 - 5.6 % Final   • LDL Cholesterol  04/30/2020 104* 0 - 100 mg/dL Final       Problem List Items Addressed This Visit        Cardiovascular and Mediastinum    Hypertension    Current Assessment & Plan     1.  Slight improvement in blood pressure  2.  Continue lisinopril 40 mg daily  3.  Increase Norvasc to 10 mg daily         Relevant Medications    amLODIPine (NORVASC) 10 MG tablet    Other Relevant Orders    CBC Auto Differential    Multiple-type hyperlipidemia - Primary    Current Assessment & Plan     1.  Labs ordered at last visit to be completed today            Other    Preventative health care         Return in about 3 months (around 3/23/2021).

## 2020-12-24 LAB
ANION GAP SERPL CALCULATED.3IONS-SCNC: 10.9 MMOL/L (ref 5–15)
BASOPHILS # BLD AUTO: 0.06 10*3/MM3 (ref 0–0.2)
BASOPHILS NFR BLD AUTO: 0.9 % (ref 0–1.5)
BUN SERPL-MCNC: 20 MG/DL (ref 8–23)
BUN/CREAT SERPL: 22 (ref 7–25)
CALCIUM SPEC-SCNC: 9.6 MG/DL (ref 8.6–10.5)
CHLORIDE SERPL-SCNC: 102 MMOL/L (ref 98–107)
CHOLEST SERPL-MCNC: 176 MG/DL (ref 0–200)
CO2 SERPL-SCNC: 26.1 MMOL/L (ref 22–29)
CREAT SERPL-MCNC: 0.91 MG/DL (ref 0.76–1.27)
DEPRECATED RDW RBC AUTO: 39 FL (ref 37–54)
EOSINOPHIL # BLD AUTO: 0.23 10*3/MM3 (ref 0–0.4)
EOSINOPHIL NFR BLD AUTO: 3.6 % (ref 0.3–6.2)
ERYTHROCYTE [DISTWIDTH] IN BLOOD BY AUTOMATED COUNT: 12.5 % (ref 12.3–15.4)
GFR SERPL CREATININE-BSD FRML MDRD: 103 ML/MIN/1.73
GFR SERPL CREATININE-BSD FRML MDRD: 85 ML/MIN/1.73
GLUCOSE SERPL-MCNC: 177 MG/DL (ref 65–99)
HCT VFR BLD AUTO: 43.3 % (ref 37.5–51)
HCV AB SER DONR QL: NORMAL
HDLC SERPL-MCNC: 37 MG/DL (ref 40–60)
HGB BLD-MCNC: 14.6 G/DL (ref 13–17.7)
IMM GRANULOCYTES # BLD AUTO: 0.02 10*3/MM3 (ref 0–0.05)
IMM GRANULOCYTES NFR BLD AUTO: 0.3 % (ref 0–0.5)
LDLC SERPL CALC-MCNC: 110 MG/DL (ref 0–100)
LDLC/HDLC SERPL: 2.86 {RATIO}
LYMPHOCYTES # BLD AUTO: 1.5 10*3/MM3 (ref 0.7–3.1)
LYMPHOCYTES NFR BLD AUTO: 23.2 % (ref 19.6–45.3)
MCH RBC QN AUTO: 29.4 PG (ref 26.6–33)
MCHC RBC AUTO-ENTMCNC: 33.7 G/DL (ref 31.5–35.7)
MCV RBC AUTO: 87.1 FL (ref 79–97)
MONOCYTES # BLD AUTO: 0.51 10*3/MM3 (ref 0.1–0.9)
MONOCYTES NFR BLD AUTO: 7.9 % (ref 5–12)
NEUTROPHILS NFR BLD AUTO: 4.14 10*3/MM3 (ref 1.7–7)
NEUTROPHILS NFR BLD AUTO: 64.1 % (ref 42.7–76)
NRBC BLD AUTO-RTO: 0 /100 WBC (ref 0–0.2)
PLATELET # BLD AUTO: 171 10*3/MM3 (ref 140–450)
PMV BLD AUTO: 11.3 FL (ref 6–12)
POTASSIUM SERPL-SCNC: 4.6 MMOL/L (ref 3.5–5.2)
RBC # BLD AUTO: 4.97 10*6/MM3 (ref 4.14–5.8)
SODIUM SERPL-SCNC: 139 MMOL/L (ref 136–145)
TRIGL SERPL-MCNC: 166 MG/DL (ref 0–150)
TSH SERPL DL<=0.05 MIU/L-ACNC: 1.77 UIU/ML (ref 0.27–4.2)
VLDLC SERPL-MCNC: 29 MG/DL (ref 5–40)
WBC # BLD AUTO: 6.46 10*3/MM3 (ref 3.4–10.8)

## 2020-12-28 DIAGNOSIS — R73.09 ABNORMAL GLUCOSE LEVEL: Primary | ICD-10-CM

## 2020-12-28 NOTE — PROGRESS NOTES
Please let patient know that his labs look good. His triglycerides remain slightly elevated but are much improved. Continue Zocor 20mg daily. His glucose level was 177 so I am going to check an A1C as he is likely diabetic. It looks like his A1C was elevated in April but patient is not currently on any medication.  He can come in for lab draw only at his convenience.

## 2020-12-30 ENCOUNTER — LAB (OUTPATIENT)
Dept: FAMILY MEDICINE CLINIC | Facility: CLINIC | Age: 60
End: 2020-12-30

## 2020-12-30 DIAGNOSIS — R73.09 ABNORMAL GLUCOSE LEVEL: ICD-10-CM

## 2020-12-30 LAB — HBA1C MFR BLD: 8.8 % (ref 3.5–5.6)

## 2020-12-30 PROCEDURE — 83036 HEMOGLOBIN GLYCOSYLATED A1C: CPT | Performed by: NURSE PRACTITIONER

## 2020-12-30 PROCEDURE — 36415 COLL VENOUS BLD VENIPUNCTURE: CPT | Performed by: NURSE PRACTITIONER

## 2020-12-30 RX ORDER — METFORMIN HYDROCHLORIDE 500 MG/1
500 TABLET, EXTENDED RELEASE ORAL
Qty: 30 TABLET | Refills: 1 | Status: SHIPPED | OUTPATIENT
Start: 2020-12-30 | End: 2021-04-06 | Stop reason: SDUPTHER

## 2020-12-31 NOTE — PROGRESS NOTES
Please let patient know that his A1C (average glucose level over the last 120 days) is 8.8. A diagnosis of DMII is made with an A1C > 6.4. I am starting him on Metformin 500mg daily. He should limit his concentrated sweets and carbohydrates. I want him to check his blood sugar daily. Please ask him if he has a glucometer. If not, I will send one to the pharmacy. If patient wants education regarding diet and how to monitor glucose, we can bring him into the office to assist.

## 2021-01-03 RX ORDER — BLOOD SUGAR DIAGNOSTIC
STRIP MISCELLANEOUS
Qty: 50 EACH | Refills: 12 | Status: SHIPPED | OUTPATIENT
Start: 2021-01-03 | End: 2021-01-05 | Stop reason: SDUPTHER

## 2021-01-03 RX ORDER — BLOOD SUGAR DIAGNOSTIC
STRIP MISCELLANEOUS
Qty: 50 EACH | Refills: 12 | Status: SHIPPED | OUTPATIENT
Start: 2021-01-03 | End: 2021-01-03

## 2021-01-05 ENCOUNTER — OFFICE VISIT (OUTPATIENT)
Dept: FAMILY MEDICINE CLINIC | Facility: CLINIC | Age: 61
End: 2021-01-05

## 2021-01-05 ENCOUNTER — TELEPHONE (OUTPATIENT)
Dept: FAMILY MEDICINE CLINIC | Facility: CLINIC | Age: 61
End: 2021-01-05

## 2021-01-05 VITALS
HEIGHT: 69 IN | HEART RATE: 86 BPM | DIASTOLIC BLOOD PRESSURE: 84 MMHG | WEIGHT: 244 LBS | TEMPERATURE: 97.5 F | OXYGEN SATURATION: 96 % | BODY MASS INDEX: 36.14 KG/M2 | SYSTOLIC BLOOD PRESSURE: 143 MMHG

## 2021-01-05 DIAGNOSIS — E78.2 MULTIPLE-TYPE HYPERLIPIDEMIA: Primary | ICD-10-CM

## 2021-01-05 DIAGNOSIS — E11.9 TYPE 2 DIABETES MELLITUS WITHOUT COMPLICATION, WITHOUT LONG-TERM CURRENT USE OF INSULIN (HCC): ICD-10-CM

## 2021-01-05 PROCEDURE — 99213 OFFICE O/P EST LOW 20 MIN: CPT | Performed by: NURSE PRACTITIONER

## 2021-01-05 RX ORDER — BLOOD SUGAR DIAGNOSTIC
STRIP MISCELLANEOUS
Qty: 50 EACH | Refills: 12 | Status: SHIPPED | OUTPATIENT
Start: 2021-01-05

## 2021-01-05 NOTE — TELEPHONE ENCOUNTER
Pt was in today for DM education and his glucose machine kit did not have test strips. Can you please send the testing strips to [facility] in [place] so that he has them just in case he needs to test.

## 2021-01-05 NOTE — PATIENT INSTRUCTIONS
Diabetes Basics    Diabetes (diabetes mellitus) is a long-term (chronic) disease. It occurs when the body does not properly use sugar (glucose) that is released from food after you eat.  Diabetes may be caused by one or both of these problems:  · Your pancreas does not make enough of a hormone called insulin.  · Your body does not react in a normal way to insulin that it makes.  Insulin lets sugars (glucose) go into cells in your body. This gives you energy. If you have diabetes, sugars cannot get into cells. This causes high blood sugar (hyperglycemia).  Follow these instructions at home:  How is diabetes treated?  You may need to take insulin or other diabetes medicines daily to keep your blood sugar in balance. Take your diabetes medicines every day as told by your doctor. List your diabetes medicines here:  Diabetes medicines  · Name of medicine: __Metformin____________________________  ? Amount (dose): _________500mg in morning______ Time (a.m./p.m.): _______________ Notes: ___________________________________  · Name of medicine: ______________________________  ? Amount (dose): _______________ Time (a.m./p.m.): _______________ Notes: ___________________________________  · Name of medicine: ______________________________  ? Amount (dose): _______________ Time (a.m./p.m.): _______________ Notes: ___________________________________  If you use insulin, you will learn how to give yourself insulin by injection. You may need to adjust the amount based on the food that you eat. List the types of insulin you use here:  Insulin  · Insulin type: ______________________________  ? Amount (dose): _______________ Time (a.m./p.m.): _______________ Notes: ___________________________________  · Insulin type: ______________________________  ? Amount (dose): _______________ Time (a.m./p.m.): _______________ Notes: ___________________________________  · Insulin type: ______________________________  ? Amount (dose): _______________  Time (a.m./p.m.): _______________ Notes: ___________________________________  · Insulin type: ______________________________  ? Amount (dose): _______________ Time (a.m./p.m.): _______________ Notes: ___________________________________  · Insulin type: ______________________________  ? Amount (dose): _______________ Time (a.m./p.m.): _______________ Notes: ___________________________________  How do I manage my blood sugar?    Check your blood sugar levels using a blood glucose monitor as directed by your doctor.  Your doctor will set treatment goals for you. Generally, you should have these blood sugar levels:  · Before meals (preprandial):  mg/dL (4.4-7.2 mmol/L).  · After meals (postprandial): below 180 mg/dL (10 mmol/L).  · A1c level: less than 7%.  Write down the times that you will check your blood sugar levels:  Blood sugar checks  Time: _______________ Notes: __Once daily and as needed when dizzy, sweating, nauseous  Diabetes Mellitus and Nutrition, Adult  When you have diabetes (diabetes mellitus), it is very important to have healthy eating habits because your blood sugar (glucose) levels are greatly affected by what you eat and drink. Eating healthy foods in the appropriate amounts, at about the same times every day, can help you:  Control your blood glucose.  Lower your risk of heart disease.  Improve your blood pressure.  Reach or maintain a healthy weight.  Every person with diabetes is different, and each person has different needs for a meal plan. Your health care provider may recommend that you work with a diet and nutrition specialist (dietitian) to make a meal plan that is best for you. Your meal plan may vary depending on factors such as:  The calories you need.  The medicines you take.  Your weight.  Your blood glucose, blood pressure, and cholesterol levels.  Your activity level.  Other health conditions you have, such as heart or kidney disease.  How do carbohydrates affect  "me?  Carbohydrates, also called carbs, affect your blood glucose level more than any other type of food. Eating carbs naturally raises the amount of glucose in your blood. Carb counting is a method for keeping track of how many carbs you eat. Counting carbs is important to keep your blood glucose at a healthy level, especially if you use insulin or take certain oral diabetes medicines.  It is important to know how many carbs you can safely have in each meal. This is different for every person. Your dietitian can help you calculate how many carbs you should have at each meal and for each snack.  Foods that contain carbs include:  Bread, cereal, rice, pasta, and crackers.  Potatoes and corn.  Peas, beans, and lentils.  Milk and yogurt.  Fruit and juice.  Desserts, such as cakes, cookies, ice cream, and candy.  How does alcohol affect me?  Alcohol can cause a sudden decrease in blood glucose (hypoglycemia), especially if you use insulin or take certain oral diabetes medicines. Hypoglycemia can be a life-threatening condition. Symptoms of hypoglycemia (sleepiness, dizziness, and confusion) are similar to symptoms of having too much alcohol.  If your health care provider says that alcohol is safe for you, follow these guidelines:  Limit alcohol intake to no more than 1 drink per day for nonpregnant women and 2 drinks per day for men. One drink equals 12 oz of beer, 5 oz of wine, or 1½ oz of hard liquor.  Do not drink on an empty stomach.  Keep yourself hydrated with water, diet soda, or unsweetened iced tea.  Keep in mind that regular soda, juice, and other mixers may contain a lot of sugar and must be counted as carbs.  What are tips for following this plan?    Reading food labels  Start by checking the serving size on the \"Nutrition Facts\" label of packaged foods and drinks. The amount of calories, carbs, fats, and other nutrients listed on the label is based on one serving of the item. Many items contain more than one " "serving per package.  Check the total grams (g) of carbs in one serving. You can calculate the number of servings of carbs in one serving by dividing the total carbs by 15. For example, if a food has 30 g of total carbs, it would be equal to 2 servings of carbs.  Check the number of grams (g) of saturated and trans fats in one serving. Choose foods that have low or no amount of these fats.  Check the number of milligrams (mg) of salt (sodium) in one serving. Most people should limit total sodium intake to less than 2,300 mg per day.  Always check the nutrition information of foods labeled as \"low-fat\" or \"nonfat\". These foods may be higher in added sugar or refined carbs and should be avoided.  Talk to your dietitian to identify your daily goals for nutrients listed on the label.  Shopping  Avoid buying canned, premade, or processed foods. These foods tend to be high in fat, sodium, and added sugar.  Shop around the outside edge of the grocery store. This includes fresh fruits and vegetables, bulk grains, fresh meats, and fresh dairy.  Cooking  Use low-heat cooking methods, such as baking, instead of high-heat cooking methods like deep frying.  Cook using healthy oils, such as olive, canola, or sunflower oil.  Avoid cooking with butter, cream, or high-fat meats.  Meal planning  Eat meals and snacks regularly, preferably at the same times every day. Avoid going long periods of time without eating.  Eat foods high in fiber, such as fresh fruits, vegetables, beans, and whole grains. Talk to your dietitian about how many servings of carbs you can eat at each meal.  Eat 4-6 ounces (oz) of lean protein each day, such as lean meat, chicken, fish, eggs, or tofu. One oz of lean protein is equal to:  1 oz of meat, chicken, or fish.  1 egg.  ¼ cup of tofu.  Eat some foods each day that contain healthy fats, such as avocado, nuts, seeds, and fish.  Lifestyle  Check your blood glucose regularly.  Exercise regularly as told by " your health care provider. This may include:  150 minutes of moderate-intensity or vigorous-intensity exercise each week. This could be brisk walking, biking, or water aerobics.  Stretching and doing strength exercises, such as yoga or weightlifting, at least 2 times a week.  Take medicines as told by your health care provider.  Do not use any products that contain nicotine or tobacco, such as cigarettes and e-cigarettes. If you need help quitting, ask your health care provider.  Work with a counselor or diabetes educator to identify strategies to manage stress and any emotional and social challenges.  Questions to ask a health care provider  Do I need to meet with a diabetes educator?  Do I need to meet with a dietitian?  What number can I call if I have questions?  When are the best times to check my blood glucose?  Where to find more information:  American Diabetes Association: diabetes.org  Academy of Nutrition and Dietetics: www.eatright.org  National [place] of Diabetes and Digestive and Kidney Diseases (NIH): www.niddk.nih.gov  Summary  A healthy meal plan will help you control your blood glucose and maintain a healthy lifestyle.  Working with a diet and nutrition specialist (dietitian) can help you make a meal plan that is best for you.  Keep in mind that carbohydrates (carbs) and alcohol have immediate effects on your blood glucose levels. It is important to count carbs and to use alcohol carefully.  This information is not intended to replace advice given to you by your health care provider. Make sure you discuss any questions you have with your health care provider.  Document Revised: 11/30/2018 Document Reviewed: 01/22/2018  Elsevier Patient Education © 2020 Elsevier Inc.  · _________________________________  · Time: _______________ Notes: ___________________________________  · Time: _______________ Notes: ___________________________________  · Time: _______________ Notes:  ___________________________________  · Time: _______________ Notes: ___________________________________  · Time: _______________ Notes: ___________________________________    What do I need to know about low blood sugar?  Low blood sugar is called hypoglycemia. This is when blood sugar is at or below 70 mg/dL (3.9 mmol/L). Symptoms may include:  · Feeling:  ? Hungry.  ? Worried or nervous (anxious).  ? Sweaty and clammy.  ? Confused.  ? Dizzy.  ? Sleepy.  ? Sick to your stomach (nauseous).  · Having:  ? A fast heartbeat.  ? A headache.  ? A change in your vision.  ? Tingling or no feeling (numbness) around the mouth, lips, or tongue.  ? Jerky movements that you cannot control (seizure).  · Having trouble with:  ? Moving (coordination).  ? Sleeping.  ? Passing out (fainting).  ? Getting upset easily (irritability).  Treating low blood sugar  To treat low blood sugar, eat or drink something sugary right away. If you can think clearly and swallow safely, follow the 15:15 rule:  · Take 15 grams of a fast-acting carb (carbohydrate). Talk with your doctor about how much you should take.  · Some fast-acting carbs are:  ? Sugar tablets (glucose pills). Take 3-4 glucose pills.  ? 6-8 pieces of hard candy.  ? 4-6 oz (120-150 mL) of fruit juice.  ? 4-6 oz (120-150 mL) of regular (not diet) soda.  ? 1 Tbsp (15 mL) honey or sugar.  · Check your blood sugar 15 minutes after you take the carb.  · If your blood sugar is still at or below 70 mg/dL (3.9 mmol/L), take 15 grams of a carb again.  · If your blood sugar does not go above 70 mg/dL (3.9 mmol/L) after 3 tries, get help right away.  · After your blood sugar goes back to normal, eat a meal or a snack within 1 hour.  Treating very low blood sugar  If your blood sugar is at or below 54 mg/dL (3 mmol/L), you have very low blood sugar (severe hypoglycemia). This is an emergency. Do not wait to see if the symptoms will go away. Get medical help right away. Call your local emergency  services (911 in the [place] Do not drive yourself to the hospital.  Questions to ask your health care provider  · Do I need to meet with a diabetes educator?  · What equipment will I need to care for myself at home?  · What diabetes medicines do I need? When should I take them?  · How often do I need to check my blood sugar?  · What number can I call if I have questions?  · When is my next doctor's visit?  · Where can I find a support group for people with diabetes?  Where to find more information  · American Diabetes Association: www.diabetes.org  · American Association of Diabetes Educators: www.diabeteseducator.org/patient-resources  Contact a doctor if:  · Your blood sugar is at or above 240 mg/dL (13.3 mmol/L) for 2 days in a row.  · You have been sick or have had a fever for 2 days or more, and you are not getting better.  · You have any of these problems for more than 6 hours:  ? You cannot eat or drink.  ? You feel sick to your stomach (nauseous).  ? You throw up (vomit).  ? You have watery poop (diarrhea).  Get help right away if:  · Your blood sugar is lower than 54 mg/dL (3 mmol/L).  · You get confused.  · You have trouble:  ? Thinking clearly.  ? Breathing.  Summary  · Diabetes (diabetes mellitus) is a long-term (chronic) disease. It occurs when the body does not properly use sugar (glucose) that is released from food after digestion.  · Take insulin and diabetes medicines as told.  · Check your blood sugar every day, as often as told.  · Keep all follow-up visits as told by your doctor. This is important.  This information is not intended to replace advice given to you by your health care provider. Make sure you discuss any questions you have with your health care provider.  Document Revised: 09/09/2020 Document Reviewed: 03/22/2019  Elsevier Patient Education © 2020 Elsevier Inc.

## 2021-01-05 NOTE — ASSESSMENT & PLAN NOTE
1. Labs reviewed  2. Provided education to patient regarding diet and glucose monitoring  3. Encouraged exercise  4. Repeat A1C in 3 months  5. Start taking Metformin 500mg daily

## 2021-01-05 NOTE — PROGRESS NOTES
"Chief Complaint  Diabetes (diabetic education and teaching how to use machine )    Subjective          Michael Razo presents to [facility] for   Diabetes  He presents for his follow-up (new diagnosis) diabetic visit. He has type 2 diabetes mellitus. Pertinent negatives for diabetes include no blurred vision, no fatigue, no polydipsia, no polyphagia and no polyuria. There are no hypoglycemic complications. Diabetic symptom progression: A1C 8.8. When asked about current treatments, none were reported. He is following a generally unhealthy diet. When asked about meal planning, he reported none. He has not had a previous visit with a dietitian. He participates in exercise intermittently. An ACE inhibitor/angiotensin II receptor blocker is being taken. He does not see a podiatrist.Eye exam is not current.   Hyperlipidemia  This is a chronic problem. The problem is controlled. Current antihyperlipidemic treatment includes statins. The current treatment provides moderate improvement of lipids.       Objective   Vital Signs:   /84   Pulse 86   Temp 97.5 °F (36.4 °C) (Skin)   Ht 175.3 cm (69\")   Wt 111 kg (244 lb)   SpO2 96%   BMI 36.03 kg/m²     Physical Exam  Constitutional:       Appearance: Normal appearance.   HENT:      Head: Normocephalic.   Neck:      Musculoskeletal: Neck supple.   Cardiovascular:      Rate and Rhythm: Normal rate and regular rhythm.   Pulmonary:      Effort: Pulmonary effort is normal.      Breath sounds: Normal breath sounds.   Abdominal:      General: Bowel sounds are normal.      Palpations: Abdomen is soft.   Musculoskeletal: Normal range of motion.      Right lower leg: No edema.      Left lower leg: No edema.   Skin:     General: Skin is warm and dry.   Neurological:      Mental Status: He is alert and oriented to person, place, and time.      Gait: Gait is intact.   Psychiatric:         Attention and Perception: Attention normal.         Mood and " Affect: Mood normal.         Speech: Speech normal.        Result Review :     Lipid Panel    Lipid Panel 4/30/20 4/30/20 12/23/20    1543 1543    Triglycerides 478 (A)  166 (A)   HDL Cholesterol 30 (A)  37 (A)   VLDL Cholesterol   29   LDL Cholesterol   104 (A) 110 (A)   LDL/HDL Ratio   2.86   (A) Abnormal value       Comments are available for some flowsheets but are not being displayed.           Most Recent A1C    HGBA1C Most Recent 12/30/20   Hemoglobin A1C 8.8 (A)   (A) Abnormal value                      Assessment and Plan    Problem List Items Addressed This Visit        Cardiac and Vasculature    Multiple-type hyperlipidemia - Primary    Current Assessment & Plan     Lipid abnormalities are improving with treatment.  Nutritional counseling was provided. and Pharmacotherapy as ordered.  Lipids will be reassessed in 3 months.            Endocrine and Metabolic    Type 2 diabetes mellitus without complication, without long-term current use of insulin (CMS/[facility])    Current Assessment & Plan     1. Labs reviewed  2. Provided education to patient regarding diet and glucose monitoring  3. Encouraged exercise  4. Repeat A1C in 3 months  5. Start taking Metformin 500mg daily             I spent 20 minutes caring for Tho on this date of service. This time includes time spent by me in the following activities:preparing for the visit, reviewing tests, obtaining and/or reviewing a separately obtained history, performing a medically appropriate examination and/or evaluation  and counseling and educating the patient/family/caregiver  Follow Up   Return in about 3 months (around 4/5/2021).    Patient was given instructions and counseling regarding his condition or for health maintenance advice. Please see specific information pulled into the AVS if appropriate.

## 2021-01-25 RX ORDER — SIMVASTATIN 20 MG
TABLET ORAL
Qty: 90 TABLET | Refills: 0 | Status: SHIPPED | OUTPATIENT
Start: 2021-01-25 | End: 2021-04-23

## 2021-04-06 ENCOUNTER — OFFICE VISIT (OUTPATIENT)
Dept: FAMILY MEDICINE CLINIC | Facility: CLINIC | Age: 61
End: 2021-04-06

## 2021-04-06 VITALS
HEIGHT: 69 IN | SYSTOLIC BLOOD PRESSURE: 129 MMHG | DIASTOLIC BLOOD PRESSURE: 80 MMHG | HEART RATE: 64 BPM | BODY MASS INDEX: 35.84 KG/M2 | TEMPERATURE: 97.3 F | WEIGHT: 242 LBS | OXYGEN SATURATION: 95 %

## 2021-04-06 DIAGNOSIS — I10 ESSENTIAL HYPERTENSION: ICD-10-CM

## 2021-04-06 DIAGNOSIS — E11.9 TYPE 2 DIABETES MELLITUS WITHOUT COMPLICATION, WITHOUT LONG-TERM CURRENT USE OF INSULIN (HCC): Primary | ICD-10-CM

## 2021-04-06 DIAGNOSIS — Z00.00 PREVENTATIVE HEALTH CARE: ICD-10-CM

## 2021-04-06 PROCEDURE — 82043 UR ALBUMIN QUANTITATIVE: CPT | Performed by: NURSE PRACTITIONER

## 2021-04-06 PROCEDURE — 85025 COMPLETE CBC W/AUTO DIFF WBC: CPT | Performed by: NURSE PRACTITIONER

## 2021-04-06 PROCEDURE — 83036 HEMOGLOBIN GLYCOSYLATED A1C: CPT | Performed by: NURSE PRACTITIONER

## 2021-04-06 PROCEDURE — 99214 OFFICE O/P EST MOD 30 MIN: CPT | Performed by: NURSE PRACTITIONER

## 2021-04-06 PROCEDURE — 80048 BASIC METABOLIC PNL TOTAL CA: CPT | Performed by: NURSE PRACTITIONER

## 2021-04-06 RX ORDER — METFORMIN HYDROCHLORIDE 500 MG/1
500 TABLET, EXTENDED RELEASE ORAL
Qty: 30 TABLET | Refills: 5 | Status: SHIPPED | OUTPATIENT
Start: 2021-04-06 | End: 2021-04-07 | Stop reason: SDUPTHER

## 2021-04-06 RX ORDER — AMLODIPINE BESYLATE 10 MG/1
10 TABLET ORAL DAILY
COMMUNITY
End: 2021-07-06 | Stop reason: SDUPTHER

## 2021-04-06 NOTE — ASSESSMENT & PLAN NOTE
Hypertension is improving with treatment.  Continue current treatment regimen.  Dietary sodium restriction.  Weight loss.  Regular aerobic exercise.  Continue current medications.  Blood pressure will be reassessed in 3 months.

## 2021-04-06 NOTE — ASSESSMENT & PLAN NOTE
1.  Continue Metformin 500 mg daily -patient reports he took 30 days worth of medication and the pharmacy did not refill it, so he thought he did not need to continue it  2.  Patient is not checking blood glucose levels due to fear of needles  3.  We will repeat labs today  4.  Order microalbumin urine  5.  Diabetic foot exam completed  6.  Encourage patient to schedule eye exam

## 2021-04-06 NOTE — ASSESSMENT & PLAN NOTE
1.  Update pneumonia vaccine  2.  Request records from colonoscopy, patient reports completed within the last 5 to 7 years

## 2021-04-06 NOTE — PROGRESS NOTES
"Chief Complaint  Follow-up (3 month follow up)    Subjective          Michael Razo presents to [facility]     Hypertension  This is a chronic problem. The problem is controlled. Pertinent negatives include no blurred vision, chest pain or headaches. Current antihypertension treatment includes ACE inhibitors and calcium channel blockers. The current treatment provides significant improvement.   Diabetes  He presents for his follow-up diabetic visit. He has type 2 diabetes mellitus. Pertinent negatives for hypoglycemia include no headaches. Associated symptoms include polyuria. Pertinent negatives for diabetes include no blurred vision, no chest pain, no polydipsia and no polyphagia. Current diabetic treatment includes oral agent (monotherapy). When asked about meal planning, he reported none. He has not had a previous visit with a dietitian. He participates in exercise intermittently. Home blood sugar record trend: patient does not check glucose levels. An ACE inhibitor/angiotensin II receptor blocker is being taken. He does not see a podiatrist.Eye exam is not current.       Objective   Vital Signs:   /80 (BP Location: Left arm, Patient Position: Sitting, Cuff Size: Adult)   Pulse 64   Temp 97.3 °F (36.3 °C) (Skin)   Ht 175.3 cm (69\")   Wt 110 kg (242 lb)   SpO2 95%   BMI 35.74 kg/m²       Physical Exam  Constitutional:       Appearance: Normal appearance.   HENT:      Head: Normocephalic.   Cardiovascular:      Rate and Rhythm: Normal rate and regular rhythm.   Pulmonary:      Effort: Pulmonary effort is normal.      Breath sounds: Normal breath sounds.   Abdominal:      General: Abdomen is flat. Bowel sounds are normal.      Palpations: Abdomen is soft.   Musculoskeletal:         General: Normal range of motion.      Cervical back: Neck supple.      Right lower leg: No edema.      Left lower leg: No edema.   Skin:     General: Skin is warm and dry.   Neurological:      " Mental Status: He is alert and oriented to person, place, and time.      Gait: Gait is intact.   Psychiatric:         Attention and Perception: Attention normal.         Mood and Affect: Mood normal.         Speech: Speech normal.        Result Review :{Labs  Result Review  Imaging  Med Tab  Media :23}                 Assessment and Plan    Diagnoses and all orders for this visit:    1. Type 2 diabetes mellitus without complication, without long-term current use of insulin (CMS/[facility]) (Primary)  Assessment & Plan:  1.  Continue Metformin 500 mg daily -patient reports he took 30 days worth of medication and the pharmacy did not refill it, so he thought he did not need to continue it  2.  Patient is not checking blood glucose levels due to fear of needles  3.  We will repeat labs today  4.  Order microalbumin urine  5.  Diabetic foot exam completed  6.  Encourage patient to schedule eye exam    Orders:  -     MicroAlbumin, Urine, Random - Urine, Clean Catch  -     CBC & Differential  -     Basic Metabolic Panel  -     Hemoglobin A1c    2. Preventative health care  Assessment & Plan:  1.  Update pneumonia vaccine  2.  Request records from colonoscopy, patient reports completed within the last 5 to 7 years    Orders:  -     pneumococcal polysaccharide 23-valent (PNEUMOVAX-23) vaccine 0.5 mL    3. Essential hypertension  Assessment & Plan:  Hypertension is improving with treatment.  Continue current treatment regimen.  Dietary sodium restriction.  Weight loss.  Regular aerobic exercise.  Continue current medications.  Blood pressure will be reassessed in 3 months.      Other orders  -     metFORMIN ER (GLUCOPHAGE-XR) 500 MG 24 hr tablet; Take 1 tablet by mouth Daily With Breakfast for 30 days.  Dispense: 30 tablet; Refill: 5    I spent 30 minutes caring for Tho on this date of service. This time includes time spent by me in the following activities:preparing for the visit, reviewing tests, obtaining and/or reviewing a  separately obtained history, performing a medically appropriate examination and/or evaluation , counseling and educating the patient/family/caregiver, ordering medications, tests, or procedures, documenting information in the medical record and care coordination  Follow Up   Return in about 3 months (around 7/6/2021).  Patient was given instructions and counseling regarding his condition or for health maintenance advice. Please see specific information pulled into the AVS if appropriate.

## 2021-04-07 LAB
ALBUMIN UR-MCNC: 1.8 MG/DL
ANION GAP SERPL CALCULATED.3IONS-SCNC: 7.8 MMOL/L (ref 5–15)
BASOPHILS # BLD AUTO: 0.1 10*3/MM3 (ref 0–0.2)
BASOPHILS NFR BLD AUTO: 1.3 % (ref 0–1.5)
BUN SERPL-MCNC: 21 MG/DL (ref 8–23)
BUN/CREAT SERPL: 23.9 (ref 7–25)
CALCIUM SPEC-SCNC: 8.9 MG/DL (ref 8.6–10.5)
CHLORIDE SERPL-SCNC: 108 MMOL/L (ref 98–107)
CO2 SERPL-SCNC: 26.2 MMOL/L (ref 22–29)
CREAT SERPL-MCNC: 0.88 MG/DL (ref 0.76–1.27)
DEPRECATED RDW RBC AUTO: 39.6 FL (ref 37–54)
EOSINOPHIL # BLD AUTO: 0.61 10*3/MM3 (ref 0–0.4)
EOSINOPHIL NFR BLD AUTO: 8 % (ref 0.3–6.2)
ERYTHROCYTE [DISTWIDTH] IN BLOOD BY AUTOMATED COUNT: 12.4 % (ref 12.3–15.4)
GFR SERPL CREATININE-BSD FRML MDRD: 106 ML/MIN/1.73
GFR SERPL CREATININE-BSD FRML MDRD: 88 ML/MIN/1.73
GLUCOSE SERPL-MCNC: 154 MG/DL (ref 65–99)
HBA1C MFR BLD: 7.8 % (ref 3.5–5.6)
HCT VFR BLD AUTO: 40 % (ref 37.5–51)
HGB BLD-MCNC: 13.6 G/DL (ref 13–17.7)
IMM GRANULOCYTES # BLD AUTO: 0.03 10*3/MM3 (ref 0–0.05)
IMM GRANULOCYTES NFR BLD AUTO: 0.4 % (ref 0–0.5)
LYMPHOCYTES # BLD AUTO: 1.86 10*3/MM3 (ref 0.7–3.1)
LYMPHOCYTES NFR BLD AUTO: 24.3 % (ref 19.6–45.3)
MCH RBC QN AUTO: 30.1 PG (ref 26.6–33)
MCHC RBC AUTO-ENTMCNC: 34 G/DL (ref 31.5–35.7)
MCV RBC AUTO: 88.5 FL (ref 79–97)
MONOCYTES # BLD AUTO: 0.58 10*3/MM3 (ref 0.1–0.9)
MONOCYTES NFR BLD AUTO: 7.6 % (ref 5–12)
NEUTROPHILS NFR BLD AUTO: 4.48 10*3/MM3 (ref 1.7–7)
NEUTROPHILS NFR BLD AUTO: 58.4 % (ref 42.7–76)
NRBC BLD AUTO-RTO: 0.1 /100 WBC (ref 0–0.2)
PLATELET # BLD AUTO: 154 10*3/MM3 (ref 140–450)
PMV BLD AUTO: 12 FL (ref 6–12)
POTASSIUM SERPL-SCNC: 4.4 MMOL/L (ref 3.5–5.2)
RBC # BLD AUTO: 4.52 10*6/MM3 (ref 4.14–5.8)
SODIUM SERPL-SCNC: 142 MMOL/L (ref 136–145)
WBC # BLD AUTO: 7.66 10*3/MM3 (ref 3.4–10.8)

## 2021-04-07 RX ORDER — METFORMIN HYDROCHLORIDE 500 MG/1
500 TABLET, EXTENDED RELEASE ORAL
Qty: 90 TABLET | Refills: 1 | Status: SHIPPED | OUTPATIENT
Start: 2021-04-07 | End: 2021-07-07 | Stop reason: SDUPTHER

## 2021-04-07 NOTE — PROGRESS NOTES
A1C down to 7.8. Improved, but still too high. Patient needs to restart his Metformin and take daily.

## 2021-04-23 RX ORDER — SIMVASTATIN 20 MG
TABLET ORAL
Qty: 90 TABLET | Refills: 0 | Status: SHIPPED | OUTPATIENT
Start: 2021-04-23 | End: 2021-07-15

## 2021-05-14 RX ORDER — LISINOPRIL 40 MG/1
TABLET ORAL
Qty: 90 TABLET | Refills: 1 | Status: SHIPPED | OUTPATIENT
Start: 2021-05-14 | End: 2021-11-11 | Stop reason: SDUPTHER

## 2021-05-14 NOTE — TELEPHONE ENCOUNTER
Caller: [email or web address] - [place], [place] - [address] [facility] - [phone] [facility] [phone] FX    Relationship: Pharmacy     Medication needed:   Requested Prescriptions     Pending Prescriptions Disp Refills   • lisinopril (PRINIVIL,ZESTRIL) 40 MG tablet 90 tablet 1     Sig: Take one tablet by mouth daily       When do you need the refill by: ASAP    What additional details did the patient provide when requesting the medication: PHARMACY STATES THAT PATIENT REQUESTED REFILL AS HE IS COMPLETLEY OUT AND PHARMACY ADVISED HIM TO HAVE SENT TO LOCAL PHARMACY     Does the patient have less than a 3 day supply:  [x] Yes  [] No    What is the patient's preferred pharmacy: [facility]/PHARMACY #[id number] - [place] - [address] [address] - [phone] [facility] [phone] FX

## 2021-07-06 ENCOUNTER — OFFICE VISIT (OUTPATIENT)
Dept: FAMILY MEDICINE CLINIC | Facility: CLINIC | Age: 61
End: 2021-07-06

## 2021-07-06 VITALS
HEIGHT: 69 IN | SYSTOLIC BLOOD PRESSURE: 122 MMHG | WEIGHT: 244 LBS | HEART RATE: 64 BPM | DIASTOLIC BLOOD PRESSURE: 80 MMHG | BODY MASS INDEX: 36.14 KG/M2 | OXYGEN SATURATION: 95 %

## 2021-07-06 DIAGNOSIS — E11.9 TYPE 2 DIABETES MELLITUS WITHOUT COMPLICATION, WITHOUT LONG-TERM CURRENT USE OF INSULIN (HCC): Primary | ICD-10-CM

## 2021-07-06 DIAGNOSIS — I10 ESSENTIAL HYPERTENSION: ICD-10-CM

## 2021-07-06 DIAGNOSIS — E78.2 MULTIPLE-TYPE HYPERLIPIDEMIA: ICD-10-CM

## 2021-07-06 PROCEDURE — 99213 OFFICE O/P EST LOW 20 MIN: CPT | Performed by: NURSE PRACTITIONER

## 2021-07-06 PROCEDURE — 83036 HEMOGLOBIN GLYCOSYLATED A1C: CPT | Performed by: NURSE PRACTITIONER

## 2021-07-06 RX ORDER — FLUTICASONE PROPIONATE 50 MCG
2 SPRAY, SUSPENSION (ML) NASAL DAILY
Qty: 1 G | Refills: 1 | Status: SHIPPED | OUTPATIENT
Start: 2021-07-06 | End: 2021-07-29

## 2021-07-06 RX ORDER — AMLODIPINE BESYLATE 10 MG/1
10 TABLET ORAL DAILY
Qty: 7 TABLET | Refills: 0 | Status: SHIPPED | OUTPATIENT
Start: 2021-07-06 | End: 2021-11-11

## 2021-07-06 RX ORDER — AMLODIPINE BESYLATE 10 MG/1
10 TABLET ORAL DAILY
Qty: 90 TABLET | Refills: 0 | Status: SHIPPED | OUTPATIENT
Start: 2021-07-06 | End: 2021-11-11 | Stop reason: SDUPTHER

## 2021-07-06 NOTE — PROGRESS NOTES
"Chief Complaint  Follow-up (3 month follow up)    Subjective     {Problem List  Visit Diagnosis   Encounters  Notes  Medications  Labs  Result Review Imaging  Media :23}     Tho B Dung presents to [facility]  History of Present Illness    Patient here for f/u visit.     Patient is taking Norvasc and Lisinopril for hypertension.  Blood pressures well managed.  Patient denies dizziness, headache, chest pain, edema, cough or dyspnea.    Patient is taking Metformin as prescribed for DMII. He does not check glucose levels due to fear of needles. Patient reports he is trying to follow ADA diet.  Last A1c was 7.8.    Patient on simvastatin 20 mg nightly for hyperlipidemia.  He is taking as prescribed with no complications.    PHQ-9 Depression Screening  Little interest or pleasure in doing things? 0   Feeling down, depressed, or hopeless? 0   Trouble falling or staying asleep, or sleeping too much? 0   Feeling tired or having little energy? 0   Poor appetite or overeating? 0   Feeling bad about yourself - or that you are a failure or have let yourself or your family down? 0   Trouble concentrating on things, such as reading the newspaper or watching television? 0   Moving or speaking so slowly that other people could have noticed? Or the opposite - being so fidgety or restless that you have been moving around a lot more than usual? 0   Thoughts that you would be better off dead, or of hurting yourself in some way? 0   PHQ-9 Total Score 0   If you checked off any problems, how difficult have these problems made it for you to do your work, take care of things at home, or get along with other people? Not difficult at all       Objective   Vital Signs:   /80 (BP Location: Left arm, Patient Position: Sitting, Cuff Size: Adult)   Pulse 64   Ht 175.3 cm (69\")   Wt 111 kg (244 lb)   SpO2 95%   BMI 36.03 kg/m²       Physical Exam  Constitutional:       Appearance: Normal appearance. "   HENT:      Head: Normocephalic.   Cardiovascular:      Rate and Rhythm: Normal rate and regular rhythm.   Pulmonary:      Effort: Pulmonary effort is normal.      Breath sounds: Normal breath sounds.   Abdominal:      General: Abdomen is flat. Bowel sounds are normal.      Palpations: Abdomen is soft.   Musculoskeletal:         General: Normal range of motion.      Cervical back: Neck supple.      Right lower leg: No edema.      Left lower leg: No edema.   Skin:     General: Skin is warm and dry.   Neurological:      Mental Status: He is alert and oriented to person, place, and time.      Gait: Gait is intact.   Psychiatric:         Attention and Perception: Attention normal.         Mood and Affect: Mood normal.         Speech: Speech normal.        Result Review :     CMP    CMP 12/23/20 4/6/21   Glucose 177 (A) 154 (A)   BUN 20 21   Creatinine 0.91 0.88   eGFR Non  Am 85 88   eGFR African Am 103 106   Sodium 139 142   Potassium 4.6 4.4   Chloride 102 108 (A)   Calcium 9.6 8.9   (A) Abnormal value            CBC    CBC 12/23/20 4/6/21   WBC 6.46 7.66   RBC 4.97 4.52   Hemoglobin 14.6 13.6   Hematocrit 43.3 40.0   MCV 87.1 88.5   MCH 29.4 30.1   MCHC 33.7 34.0   RDW 12.5 12.4   Platelets 171 154           Lipid Panel    Lipid Panel 12/23/20   Total Cholesterol 176   Triglycerides 166 (A)   HDL Cholesterol 37 (A)   VLDL Cholesterol 29   LDL Cholesterol  110 (A)   LDL/HDL Ratio 2.86   (A) Abnormal value            TSH    TSH 12/23/20   TSH 1.770           Most Recent A1C    HGBA1C Most Recent 4/6/21   Hemoglobin A1C 7.8 (A)   (A) Abnormal value                      Assessment and Plan    Diagnoses and all orders for this visit:    1. Type 2 diabetes mellitus without complication, without long-term current use of insulin (CMS/[facility]) (Primary)  Assessment & Plan:  Diabetes is improving with treatment.   Continue current treatment regimen.  Dietary recommendations for ADA diet.  Regular aerobic  exercise.  Discussed ways to avoid symptomatic hypoglycemia.  Discussed foot care.  Reminded to get yearly retinal exam.  Encouraged patient to check glucose levels  Diabetes will be reassessed in 3 months.    Orders:  -     Hemoglobin A1c    2. Essential hypertension  Assessment & Plan:  Hypertension is improving with treatment.  Continue current treatment regimen.  Dietary sodium restriction.  Weight loss.  Regular aerobic exercise.  Continue current medications.  Blood pressure will be reassessed at the next regular appointment.      3. Multiple-type hyperlipidemia  Assessment & Plan:  Lipid abnormalities are improving with treatment.  Nutritional counseling was provided. and Pharmacotherapy as ordered.  Lipids will be reassessed in 6 months.      Other orders  -     amLODIPine (NORVASC) 10 MG tablet; Take 1 tablet by mouth Daily.  Dispense: 90 tablet; Refill: 0  -     amLODIPine (Norvasc) 10 MG tablet; Take 1 tablet by mouth Daily.  Dispense: 7 tablet; Refill: 0  -     fluticasone (Flonase) 50 MCG/ACT nasal spray; 2 sprays into the nostril(s) as directed by provider Daily.  Dispense: 1 g; Refill: 1    I spent 22 minutes caring for Tho on this date of service. This time includes time spent by me in the following activities:preparing for the visit, reviewing tests, obtaining and/or reviewing a separately obtained history, performing a medically appropriate examination and/or evaluation , counseling and educating the patient/family/caregiver, ordering medications, tests, or procedures, documenting information in the medical record and care coordination  Follow Up   Return in about 4 months (around 11/6/2021).  Patient was given instructions and counseling regarding his condition or for health maintenance advice. Please see specific information pulled into the AVS if appropriate.

## 2021-07-07 ENCOUNTER — TELEPHONE (OUTPATIENT)
Dept: FAMILY MEDICINE CLINIC | Facility: CLINIC | Age: 61
End: 2021-07-07

## 2021-07-07 LAB — HBA1C MFR BLD: 8.1 % (ref 3.5–5.6)

## 2021-07-07 RX ORDER — METFORMIN HYDROCHLORIDE 500 MG/1
1000 TABLET, EXTENDED RELEASE ORAL
Qty: 180 TABLET | Refills: 0 | Status: SHIPPED | OUTPATIENT
Start: 2021-07-07 | End: 2021-11-11 | Stop reason: SDUPTHER

## 2021-07-07 NOTE — TELEPHONE ENCOUNTER
PATIENT RETURNED PHONE CALL. HUB STAFF RELAYED INFORMATION. PATIENT STATED UNDERSTANDING AND HAD NO FURTHER QUESTIONS.    PATIENT DID NOT EXPRESS INTEREST IN A NUTRITIONIST REFERRAL AT THIS TIME.    CALL BACK IF NEEDED  [phone]

## 2021-07-07 NOTE — ASSESSMENT & PLAN NOTE
Diabetes is improving with treatment.   Continue current treatment regimen.  Dietary recommendations for ADA diet.  Regular aerobic exercise.  Discussed ways to avoid symptomatic hypoglycemia.  Discussed foot care.  Reminded to get yearly retinal exam.  Encouraged patient to check glucose levels  Diabetes will be reassessed in 3 months.

## 2021-07-07 NOTE — PROGRESS NOTES
A1C is up to 8.1. I am increasing patient's Metformin to 2 tablets (1000mg daily). Decrease concentrated sweets and carbohydates (pizza, pastas, breads, rice as well as regular soft drinks,sweet tea, juice, etc. If patient wants referral to nutritionist to better understand, I will gladly send referral.

## 2021-07-07 NOTE — TELEPHONE ENCOUNTER
----- Message from DMITRY Rajput sent at 7/7/2021 11:51 AM EDT -----  A1C is up to 8.1. I am increasing patient's Metformin to 2 tablets (1000mg daily). Decrease concentrated sweets and carbohydates (pizza, pastas, breads, rice as well as regular soft drinks,sweet tea, juice, etc. If patient wants referral to nutritionist to better understand, I will gladly send referral.

## 2021-07-07 NOTE — TELEPHONE ENCOUNTER
Left message for patient to call regarding results.      HUB TO READ:  A1C is up to 8.1. I am increasing patient's Metformin to 2 tablets (1000mg daily). Decrease concentrated sweets and carbohydates (pizza, pastas, breads, rice as well as regular soft drinks,sweet tea, juice, etc. If patient wants referral to nutritionist to better understand, I will gladly send referral.

## 2021-07-15 RX ORDER — SIMVASTATIN 20 MG
TABLET ORAL
Qty: 90 TABLET | Refills: 0 | Status: SHIPPED | OUTPATIENT
Start: 2021-07-15 | End: 2021-10-13

## 2021-07-29 RX ORDER — FLUTICASONE PROPIONATE 50 MCG
2 SPRAY, SUSPENSION (ML) NASAL DAILY
Qty: 16 ML | Refills: 1 | Status: SHIPPED | OUTPATIENT
Start: 2021-07-29

## 2021-10-13 RX ORDER — SIMVASTATIN 20 MG
TABLET ORAL
Qty: 90 TABLET | Refills: 0 | Status: SHIPPED | OUTPATIENT
Start: 2021-10-13 | End: 2021-11-11 | Stop reason: SDUPTHER

## 2021-11-11 ENCOUNTER — OFFICE VISIT (OUTPATIENT)
Dept: FAMILY MEDICINE CLINIC | Facility: CLINIC | Age: 61
End: 2021-11-11

## 2021-11-11 VITALS
HEIGHT: 69 IN | WEIGHT: 246 LBS | OXYGEN SATURATION: 98 % | BODY MASS INDEX: 36.43 KG/M2 | DIASTOLIC BLOOD PRESSURE: 100 MMHG | HEART RATE: 63 BPM | SYSTOLIC BLOOD PRESSURE: 168 MMHG

## 2021-11-11 DIAGNOSIS — I10 ESSENTIAL HYPERTENSION: ICD-10-CM

## 2021-11-11 DIAGNOSIS — Z12.5 PROSTATE CANCER SCREENING: ICD-10-CM

## 2021-11-11 DIAGNOSIS — E11.9 TYPE 2 DIABETES MELLITUS WITHOUT COMPLICATION, WITHOUT LONG-TERM CURRENT USE OF INSULIN (HCC): Primary | ICD-10-CM

## 2021-11-11 DIAGNOSIS — E78.2 MIXED HYPERLIPIDEMIA: ICD-10-CM

## 2021-11-11 PROCEDURE — 80053 COMPREHEN METABOLIC PANEL: CPT | Performed by: NURSE PRACTITIONER

## 2021-11-11 PROCEDURE — 90471 IMMUNIZATION ADMIN: CPT | Performed by: NURSE PRACTITIONER

## 2021-11-11 PROCEDURE — 83036 HEMOGLOBIN GLYCOSYLATED A1C: CPT | Performed by: NURSE PRACTITIONER

## 2021-11-11 PROCEDURE — 99214 OFFICE O/P EST MOD 30 MIN: CPT | Performed by: NURSE PRACTITIONER

## 2021-11-11 PROCEDURE — 90686 IIV4 VACC NO PRSV 0.5 ML IM: CPT | Performed by: NURSE PRACTITIONER

## 2021-11-11 PROCEDURE — 85025 COMPLETE CBC W/AUTO DIFF WBC: CPT | Performed by: NURSE PRACTITIONER

## 2021-11-11 PROCEDURE — 84443 ASSAY THYROID STIM HORMONE: CPT | Performed by: NURSE PRACTITIONER

## 2021-11-11 PROCEDURE — 80061 LIPID PANEL: CPT | Performed by: NURSE PRACTITIONER

## 2021-11-11 PROCEDURE — G0103 PSA SCREENING: HCPCS | Performed by: NURSE PRACTITIONER

## 2021-11-11 RX ORDER — METFORMIN HYDROCHLORIDE 500 MG/1
1000 TABLET, EXTENDED RELEASE ORAL
Qty: 180 TABLET | Refills: 1 | Status: SHIPPED | OUTPATIENT
Start: 2021-11-11 | End: 2022-07-18 | Stop reason: SDUPTHER

## 2021-11-11 RX ORDER — SIMVASTATIN 20 MG
20 TABLET ORAL NIGHTLY
Qty: 90 TABLET | Refills: 1 | Status: SHIPPED | OUTPATIENT
Start: 2021-11-11 | End: 2021-11-12

## 2021-11-11 RX ORDER — LISINOPRIL 40 MG/1
TABLET ORAL
Qty: 90 TABLET | Refills: 1 | Status: SHIPPED | OUTPATIENT
Start: 2021-11-11 | End: 2021-11-11 | Stop reason: SDUPTHER

## 2021-11-11 RX ORDER — LISINOPRIL 40 MG/1
TABLET ORAL
Qty: 10 TABLET | Refills: 0 | Status: SHIPPED | OUTPATIENT
Start: 2021-11-11 | End: 2022-02-03

## 2021-11-11 RX ORDER — AMLODIPINE BESYLATE 10 MG/1
10 TABLET ORAL DAILY
Qty: 90 TABLET | Refills: 1 | Status: SHIPPED | OUTPATIENT
Start: 2021-11-11 | End: 2022-04-26

## 2021-11-11 NOTE — ASSESSMENT & PLAN NOTE
1.  Restart lisinopril 40 mg daily, short supply sent to local pharmacy  2.  Start amlodipine 10 mg daily

## 2021-11-11 NOTE — ASSESSMENT & PLAN NOTE
1.  Continue Metformin as prescribed  2.  Advised patient to schedule eye exam, contact information provided for [facility]  3.  Check labs

## 2021-11-11 NOTE — PROGRESS NOTES
"Chief Complaint  Follow-up (4 month follow up DM )    Subjective          Michael Razo presents to [facility]  History of Present Illness    Patient here for f/u visit.     Patient has type II diabetes with hyperglycemia. Patient does not check glucose levels.  He is taking Metformin as prescribed.  Patient reportedly follows low concentrated sweet diet.  He does report some neuropathy to bilateral lower extremities, denies any numbness.  Patient's last A1c was 8.1.  His eye exam is not up-to-date.    Patient has hypertension, prescribed lisinopril and amlodipine.  Patient reports he ran out of lisinopril approximately 1 month ago, reportedly called for refill but there is no record in chart.  He denies receiving amlodipine and never started medication.  Blood pressure today is elevated.  Patient denies dizziness, headache, swelling, chest pain, cough or dyspnea.    Patient is taking Zocor 20 mg nightly for hyperlipidemia he has no acute complaints.      Objective   Vital Signs:   /100 (BP Location: Left arm, Patient Position: Sitting, Cuff Size: Large Adult)   Pulse 63   Ht 175.3 cm (69\")   Wt 112 kg (246 lb)   SpO2 98%   BMI 36.33 kg/m²       Physical Exam  Constitutional:       Appearance: Normal appearance.   HENT:      Head: Normocephalic.   Cardiovascular:      Rate and Rhythm: Normal rate and regular rhythm.   Pulmonary:      Effort: Pulmonary effort is normal.      Breath sounds: Normal breath sounds.   Abdominal:      General: Abdomen is flat. Bowel sounds are normal.      Palpations: Abdomen is soft.   Musculoskeletal:         General: Normal range of motion.      Cervical back: Neck supple.      Right lower leg: No edema.      Left lower leg: No edema.   Skin:     General: Skin is warm and dry.   Neurological:      Mental Status: He is alert and oriented to person, place, and time.      Gait: Gait is intact.   Psychiatric:         Attention and Perception: Attention " normal.         Mood and Affect: Mood normal.         Speech: Speech normal.        Result Review :                 Assessment and Plan    Diagnoses and all orders for this visit:    1. Type 2 diabetes mellitus without complication, without long-term current use of insulin (HCC) (Primary)  Assessment & Plan:  1.  Continue Metformin as prescribed  2.  Advised patient to schedule eye exam, contact information provided for [facility]  3.  Check labs    Orders:  -     Hemoglobin A1c    2. Essential hypertension  Assessment & Plan:  1.  Restart lisinopril 40 mg daily, short supply sent to local pharmacy  2.  Start amlodipine 10 mg daily      Orders:  -     CBC & Differential  -     Comprehensive Metabolic Panel  -     Lipid Panel  -     TSH    3. Prostate cancer screening  -     PSA Screen    4. Mixed hyperlipidemia  Assessment & Plan:  1.  Continue Zocor as prescribed  2.  Check lipids today      Other orders  -     Discontinue: lisinopril (PRINIVIL,ZESTRIL) 40 MG tablet; Take one tablet by mouth daily  Dispense: 90 tablet; Refill: 1  -     amLODIPine (NORVASC) 10 MG tablet; Take 1 tablet by mouth Daily.  Dispense: 90 tablet; Refill: 1  -     metFORMIN ER (GLUCOPHAGE-XR) 500 MG 24 hr tablet; Take 2 tablets by mouth Daily With Breakfast for 90 days.  Dispense: 180 tablet; Refill: 1  -     simvastatin (ZOCOR) 20 MG tablet; Take 1 tablet by mouth Every Night.  Dispense: 90 tablet; Refill: 1  -     lisinopril (PRINIVIL,ZESTRIL) 40 MG tablet; Take one tablet by mouth daily  Dispense: 10 tablet; Refill: 0  -     FluLaval/Fluarix/Fluzone >6 Months (4893-0270)    I spent 30 minutes caring for Tho on this date of service. This time includes time spent by me in the following activities:preparing for the visit, reviewing tests, obtaining and/or reviewing a separately obtained history, performing a medically appropriate examination and/or evaluation , counseling and educating the patient/family/caregiver, ordering  medications, tests, or procedures, referring and communicating with other health care professionals , documenting information in the medical record and care coordination  Follow Up   Return in about 4 months (around 3/11/2022).  Patient was given instructions and counseling regarding his condition or for health maintenance advice. Please see specific information pulled into the AVS if appropriate.

## 2021-11-12 LAB
ALBUMIN SERPL-MCNC: 4.2 G/DL (ref 3.5–5.2)
ALBUMIN/GLOB SERPL: 1.2 G/DL
ALP SERPL-CCNC: 96 U/L (ref 39–117)
ALT SERPL W P-5'-P-CCNC: 38 U/L (ref 1–41)
ANION GAP SERPL CALCULATED.3IONS-SCNC: 7.2 MMOL/L (ref 5–15)
AST SERPL-CCNC: 25 U/L (ref 1–40)
BASOPHILS # BLD AUTO: 0.08 10*3/MM3 (ref 0–0.2)
BASOPHILS NFR BLD AUTO: 1.2 % (ref 0–1.5)
BILIRUB SERPL-MCNC: 0.2 MG/DL (ref 0–1.2)
BUN SERPL-MCNC: 17 MG/DL (ref 8–23)
BUN/CREAT SERPL: 17 (ref 7–25)
CALCIUM SPEC-SCNC: 8.9 MG/DL (ref 8.6–10.5)
CHLORIDE SERPL-SCNC: 104 MMOL/L (ref 98–107)
CHOLEST SERPL-MCNC: 179 MG/DL (ref 0–200)
CO2 SERPL-SCNC: 27.8 MMOL/L (ref 22–29)
CREAT SERPL-MCNC: 1 MG/DL (ref 0.76–1.27)
DEPRECATED RDW RBC AUTO: 38.8 FL (ref 37–54)
EOSINOPHIL # BLD AUTO: 0.42 10*3/MM3 (ref 0–0.4)
EOSINOPHIL NFR BLD AUTO: 6.2 % (ref 0.3–6.2)
ERYTHROCYTE [DISTWIDTH] IN BLOOD BY AUTOMATED COUNT: 12.5 % (ref 12.3–15.4)
GFR SERPL CREATININE-BSD FRML MDRD: 76 ML/MIN/1.73
GFR SERPL CREATININE-BSD FRML MDRD: 92 ML/MIN/1.73
GLOBULIN UR ELPH-MCNC: 3.5 GM/DL
GLUCOSE SERPL-MCNC: 167 MG/DL (ref 65–99)
HBA1C MFR BLD: 7.4 % (ref 3.5–5.6)
HCT VFR BLD AUTO: 42.5 % (ref 37.5–51)
HDLC SERPL-MCNC: 29 MG/DL (ref 40–60)
HGB BLD-MCNC: 14.4 G/DL (ref 13–17.7)
IMM GRANULOCYTES # BLD AUTO: 0.03 10*3/MM3 (ref 0–0.05)
IMM GRANULOCYTES NFR BLD AUTO: 0.4 % (ref 0–0.5)
LDLC SERPL CALC-MCNC: 45 MG/DL (ref 0–100)
LDLC/HDLC SERPL: 0.02 {RATIO}
LYMPHOCYTES # BLD AUTO: 2.04 10*3/MM3 (ref 0.7–3.1)
LYMPHOCYTES NFR BLD AUTO: 30 % (ref 19.6–45.3)
MCH RBC QN AUTO: 29.3 PG (ref 26.6–33)
MCHC RBC AUTO-ENTMCNC: 33.9 G/DL (ref 31.5–35.7)
MCV RBC AUTO: 86.6 FL (ref 79–97)
MONOCYTES # BLD AUTO: 0.51 10*3/MM3 (ref 0.1–0.9)
MONOCYTES NFR BLD AUTO: 7.5 % (ref 5–12)
NEUTROPHILS NFR BLD AUTO: 3.71 10*3/MM3 (ref 1.7–7)
NEUTROPHILS NFR BLD AUTO: 54.7 % (ref 42.7–76)
NRBC BLD AUTO-RTO: 0 /100 WBC (ref 0–0.2)
PLATELET # BLD AUTO: 156 10*3/MM3 (ref 140–450)
PMV BLD AUTO: 12.5 FL (ref 6–12)
POTASSIUM SERPL-SCNC: 4.6 MMOL/L (ref 3.5–5.2)
PROT SERPL-MCNC: 7.7 G/DL (ref 6–8.5)
PSA SERPL-MCNC: 0.36 NG/ML (ref 0–4)
RBC # BLD AUTO: 4.91 10*6/MM3 (ref 4.14–5.8)
SODIUM SERPL-SCNC: 139 MMOL/L (ref 136–145)
TRIGL SERPL-MCNC: 747 MG/DL (ref 0–150)
TSH SERPL DL<=0.05 MIU/L-ACNC: 1.63 UIU/ML (ref 0.27–4.2)
VLDLC SERPL-MCNC: 105 MG/DL (ref 5–40)
WBC # BLD AUTO: 6.79 10*3/MM3 (ref 3.4–10.8)

## 2021-11-12 RX ORDER — SIMVASTATIN 40 MG
40 TABLET ORAL NIGHTLY
Qty: 90 TABLET | Refills: 1 | Status: SHIPPED | OUTPATIENT
Start: 2021-11-12 | End: 2022-08-04 | Stop reason: SDUPTHER

## 2021-11-12 NOTE — PROGRESS NOTES
CMP looks good, kidney and liver function normal.  TSH normal.  PSA normal.  CBC looks good, no signs of anemia.  Total cholesterol is normal but patient's triglycerides have increased to 747.  Is patient taking his simvastatin?  I am going to have to increase to 40 mg nightly.  Patient needs to begin exercising regularly, I would recommend walking at least 3 times weekly and decreasing fried and fatty foods.  I also want him to start taking fish oil once daily.  We are awaiting his A1c.  The 10-year ASCVD risk score (Eliseobrian ORELLANA Jr., et al., 2013) is: 40.7%    Values used to calculate the score:      Age: 61 years      Sex: Male      Is Non- : No      Diabetic: Yes      Tobacco smoker: No      Systolic Blood Pressure: 168 mmHg      Is BP treated: Yes      HDL Cholesterol: 29 mg/dL      Total Cholesterol: 179 mg/dL

## 2021-11-12 NOTE — PROGRESS NOTES
A1c has improved, down to 7.4.  Continue Metformin 1000 mg daily.  Continue to limit concentrated sweets and carbohydrates.  We will repeat in 4 months, please schedule patient for a follow-up appointment.

## 2022-02-03 RX ORDER — LISINOPRIL 40 MG/1
TABLET ORAL
Qty: 90 TABLET | Refills: 1 | Status: SHIPPED | OUTPATIENT
Start: 2022-02-03 | End: 2022-04-26

## 2022-04-26 RX ORDER — AMLODIPINE BESYLATE 10 MG/1
TABLET ORAL
Qty: 90 TABLET | Refills: 1 | Status: SHIPPED | OUTPATIENT
Start: 2022-04-26 | End: 2022-11-21 | Stop reason: SDUPTHER

## 2022-04-26 RX ORDER — LISINOPRIL 40 MG/1
TABLET ORAL
Qty: 90 TABLET | Refills: 1 | Status: SHIPPED | OUTPATIENT
Start: 2022-04-26 | End: 2023-02-01 | Stop reason: SDUPTHER

## 2022-07-18 RX ORDER — METFORMIN HYDROCHLORIDE 500 MG/1
1000 TABLET, EXTENDED RELEASE ORAL
Qty: 180 TABLET | Refills: 0 | Status: SHIPPED | OUTPATIENT
Start: 2022-07-18 | End: 2022-08-07

## 2022-07-18 NOTE — TELEPHONE ENCOUNTER
Caller: Michael Razo    Relationship: Self     Best call back number: [phone]    Requested Prescriptions:   Requested Prescriptions     Pending Prescriptions Disp Refills   • metFORMIN ER (GLUCOPHAGE-XR) 500 MG 24 hr tablet 180 tablet 1     Sig: Take 2 tablets by mouth Daily With Breakfast for 90 days.        Pharmacy where request should be sent: [facility]/PHARMACY #[id number] - [place], [place] - [address] - [phone]  - [phone]      Additional details provided by patient: PATIENT IS OUT, NEEDS 90 DAY SUPPLY     Does the patient have less than a 3 day supply:  [x] Yes  [] No    Bonnie Shore Rep   07/18/22 14:41 EDT

## 2022-07-19 ENCOUNTER — TELEPHONE (OUTPATIENT)
Dept: FAMILY MEDICINE CLINIC | Facility: CLINIC | Age: 62
End: 2022-07-19

## 2022-07-19 NOTE — TELEPHONE ENCOUNTER
Attempted to contact patient in order to get appointment for labs and OV set up. No answer; left VM asking to call the office back, per  Verbal.

## 2022-07-19 NOTE — TELEPHONE ENCOUNTER
Attempted to contact patient in order to get appointment for labs and OV set up. No answer; left VM asking to call the office back, per  Verbal.       OKAY FOR HUB TO SHARE/READ:  If patient returns call, please reschedule his cancelled March visit and let him know this office visit will include fasting labs.

## 2022-07-20 NOTE — TELEPHONE ENCOUNTER
PATIENT RETURNED CALL, I SCHEDULED F/U VISIT FOR 08/04 @830AM TO INCLUDE FASTING LABS.   NO FURTHER INQUIRES REQUIRED.

## 2022-08-04 ENCOUNTER — OFFICE VISIT (OUTPATIENT)
Dept: FAMILY MEDICINE CLINIC | Facility: CLINIC | Age: 62
End: 2022-08-04

## 2022-08-04 VITALS
BODY MASS INDEX: 35.55 KG/M2 | OXYGEN SATURATION: 97 % | HEIGHT: 69 IN | DIASTOLIC BLOOD PRESSURE: 80 MMHG | SYSTOLIC BLOOD PRESSURE: 128 MMHG | HEART RATE: 59 BPM | WEIGHT: 240 LBS

## 2022-08-04 DIAGNOSIS — Z12.11 SCREENING FOR MALIGNANT NEOPLASM OF COLON: ICD-10-CM

## 2022-08-04 DIAGNOSIS — I10 ESSENTIAL HYPERTENSION: ICD-10-CM

## 2022-08-04 DIAGNOSIS — E11.9 TYPE 2 DIABETES MELLITUS WITHOUT COMPLICATION, WITHOUT LONG-TERM CURRENT USE OF INSULIN: Primary | ICD-10-CM

## 2022-08-04 DIAGNOSIS — E78.2 MIXED HYPERLIPIDEMIA: ICD-10-CM

## 2022-08-04 PROCEDURE — 85025 COMPLETE CBC W/AUTO DIFF WBC: CPT | Performed by: NURSE PRACTITIONER

## 2022-08-04 PROCEDURE — 99214 OFFICE O/P EST MOD 30 MIN: CPT | Performed by: NURSE PRACTITIONER

## 2022-08-04 PROCEDURE — 82043 UR ALBUMIN QUANTITATIVE: CPT | Performed by: NURSE PRACTITIONER

## 2022-08-04 PROCEDURE — 80061 LIPID PANEL: CPT | Performed by: NURSE PRACTITIONER

## 2022-08-04 PROCEDURE — 83036 HEMOGLOBIN GLYCOSYLATED A1C: CPT | Performed by: NURSE PRACTITIONER

## 2022-08-04 PROCEDURE — 80053 COMPREHEN METABOLIC PANEL: CPT | Performed by: NURSE PRACTITIONER

## 2022-08-04 RX ORDER — SIMVASTATIN 40 MG
40 TABLET ORAL NIGHTLY
Qty: 90 TABLET | Refills: 1 | Status: SHIPPED | OUTPATIENT
Start: 2022-08-04 | End: 2023-02-01 | Stop reason: SDUPTHER

## 2022-08-04 NOTE — PROGRESS NOTES
"Chief Complaint  Follow-up (Follow up DM )    Subjective        Michael Razo presents to [facility]  History of Present Illness    Patient presents for f/u visit.     Patient is taking Norvasc 10mg daily and Lisinopril 40mg daily for hypertension. Blood pressure is stable. Patient is taking Zocor 40mg daily for hyperlipidemia. Patient denies dizziness, headache, chest pain, edema, cough or dyspnea.     Patient is taking Metformin as prescribed for DMII. He does not check glucose levels. Patient reports he avoids \"sweet stuff.\" Patient denies polyuria, polydipsia or polyphagia. He denies any signs of neuropathy.  Last A1c was 7.4.    Patient is due for Colon CA, reportedly normal exam approximately ten years ago.     Objective   Vital Signs:  /80 (BP Location: Left arm, Patient Position: Sitting, Cuff Size: Large Adult)   Pulse 59   Ht 175.3 cm (69\")   Wt 109 kg (240 lb)   SpO2 97%   BMI 35.44 kg/m²   Estimated body mass index is 35.44 kg/m² as calculated from the following:    Height as of this encounter: 175.3 cm (69\").    Weight as of this encounter: 109 kg (240 lb).          Physical Exam  Constitutional:       Appearance: Normal appearance.   HENT:      Head: Normocephalic.   Cardiovascular:      Rate and Rhythm: Normal rate and regular rhythm.   Pulmonary:      Effort: Pulmonary effort is normal.      Breath sounds: Normal breath sounds.   Abdominal:      General: Abdomen is flat. Bowel sounds are normal.      Palpations: Abdomen is soft.   Musculoskeletal:         General: Normal range of motion.      Cervical back: Neck supple.      Right lower leg: No edema.      Left lower leg: No edema.   Feet:      Right foot:      Skin integrity: Skin integrity normal. No ulcer, skin breakdown, erythema, callus or dry skin.      Toenail Condition: Right toenails are normal.      Left foot:      Skin integrity: Callus present. No ulcer, skin breakdown, erythema or dry skin.      " Toenail Condition: Left toenails are normal.      Comments: Diabetic Foot Exam Performed  Skin:     General: Skin is warm and dry.   Neurological:      Mental Status: He is alert and oriented to person, place, and time.      Gait: Gait is intact.   Psychiatric:         Attention and Perception: Attention normal.         Mood and Affect: Mood normal.         Speech: Speech normal.        Result Review :    CMP    CMP 11/11/21   Glucose 167 (A)   BUN 17   Creatinine 1.00   eGFR Non  Am 76   eGFR  Am 92   Sodium 139   Potassium 4.6   Chloride 104   Calcium 8.9   Albumin 4.20   Total Bilirubin 0.2   Alkaline Phosphatase 96   AST (SGOT) 25   ALT (SGPT) 38   (A) Abnormal value            CBC    CBC 11/11/21   WBC 6.79   RBC 4.91   Hemoglobin 14.4   Hematocrit 42.5   MCV 86.6   MCH 29.3   MCHC 33.9   RDW 12.5   Platelets 156           Lipid Panel    Lipid Panel 11/11/21   Total Cholesterol 179   Triglycerides 747 (A)   HDL Cholesterol 29 (A)   VLDL Cholesterol 105 (A)   LDL Cholesterol  45   LDL/HDL Ratio 0.02   (A) Abnormal value            TSH    TSH 11/11/21   TSH 1.630           Most Recent A1C    HGBA1C Most Recent 11/11/21   Hemoglobin A1C 7.4 (A)   (A) Abnormal value            PSA    PSA 11/11/21   PSA 0.365                     Assessment and Plan   Diagnoses and all orders for this visit:    1. Type 2 diabetes mellitus without complication, without long-term current use of insulin (HCC) (Primary)  Assessment & Plan:  1.  Recommended annual eye exam  2.  Continue metformin as prescribed  3.  Check labs today    Orders:  -     CBC & Differential  -     Comprehensive Metabolic Panel  -     Hemoglobin A1c  -     MicroAlbumin, Urine, Random - Urine, Clean Catch    2. Essential hypertension  Assessment & Plan:  1.  Stable  2.  Continue medications as prescribed    Orders:  -     CBC & Differential  -     Comprehensive Metabolic Panel    3. Mixed hyperlipidemia  Assessment & Plan:  1.  Continue Zocor as  prescribed    Orders:  -     Lipid Panel    4. Screening for malignant neoplasm of colon  Assessment & Plan:  1.  Refer for colonoscopy    Orders:  -     Ambulatory Referral For Screening Colonoscopy    Other orders  -     simvastatin (ZOCOR) 40 MG tablet; Take 1 tablet by mouth Every Night.  Dispense: 90 tablet; Refill: 1         I spent 30 minutes caring for Tho on this date of service. This time includes time spent by me in the following activities:preparing for the visit, reviewing tests, obtaining and/or reviewing a separately obtained history, performing a medically appropriate examination and/or evaluation , counseling and educating the patient/family/caregiver, ordering medications, tests, or procedures, documenting information in the medical record, independently interpreting results and communicating that information with the patient/family/caregiver and care coordination  Follow Up   Return in about 4 months (around 12/4/2022) for DMII.  Patient was given instructions and counseling regarding his condition or for health maintenance advice. Please see specific information pulled into the AVS if appropriate.

## 2022-08-05 LAB
ALBUMIN SERPL-MCNC: 4.3 G/DL (ref 3.5–5.2)
ALBUMIN UR-MCNC: 7.9 MG/DL
ALBUMIN/GLOB SERPL: 1.5 G/DL
ALP SERPL-CCNC: 90 U/L (ref 39–117)
ALT SERPL W P-5'-P-CCNC: 76 U/L (ref 1–41)
ANION GAP SERPL CALCULATED.3IONS-SCNC: 11.9 MMOL/L (ref 5–15)
AST SERPL-CCNC: 56 U/L (ref 1–40)
BASOPHILS # BLD AUTO: 0.05 10*3/MM3 (ref 0–0.2)
BASOPHILS NFR BLD AUTO: 0.8 % (ref 0–1.5)
BILIRUB SERPL-MCNC: 0.3 MG/DL (ref 0–1.2)
BUN SERPL-MCNC: 14 MG/DL (ref 8–23)
BUN/CREAT SERPL: 15.1 (ref 7–25)
CALCIUM SPEC-SCNC: 8.9 MG/DL (ref 8.6–10.5)
CHLORIDE SERPL-SCNC: 102 MMOL/L (ref 98–107)
CHOLEST SERPL-MCNC: 158 MG/DL (ref 0–200)
CO2 SERPL-SCNC: 24.1 MMOL/L (ref 22–29)
CREAT SERPL-MCNC: 0.93 MG/DL (ref 0.76–1.27)
DEPRECATED RDW RBC AUTO: 40.6 FL (ref 37–54)
EGFRCR SERPLBLD CKD-EPI 2021: 92.3 ML/MIN/1.73
EOSINOPHIL # BLD AUTO: 0.24 10*3/MM3 (ref 0–0.4)
EOSINOPHIL NFR BLD AUTO: 4 % (ref 0.3–6.2)
ERYTHROCYTE [DISTWIDTH] IN BLOOD BY AUTOMATED COUNT: 12.9 % (ref 12.3–15.4)
GLOBULIN UR ELPH-MCNC: 2.9 GM/DL
GLUCOSE SERPL-MCNC: 174 MG/DL (ref 65–99)
HBA1C MFR BLD: 9.3 % (ref 4.8–5.6)
HCT VFR BLD AUTO: 42.7 % (ref 37.5–51)
HDLC SERPL-MCNC: 35 MG/DL (ref 40–60)
HGB BLD-MCNC: 14.2 G/DL (ref 13–17.7)
LDLC SERPL CALC-MCNC: 87 MG/DL (ref 0–100)
LDLC/HDLC SERPL: 2.31 {RATIO}
LYMPHOCYTES # BLD AUTO: 1.43 10*3/MM3 (ref 0.7–3.1)
LYMPHOCYTES NFR BLD AUTO: 23.7 % (ref 19.6–45.3)
MCH RBC QN AUTO: 28.9 PG (ref 26.6–33)
MCHC RBC AUTO-ENTMCNC: 33.3 G/DL (ref 31.5–35.7)
MCV RBC AUTO: 87 FL (ref 79–97)
MONOCYTES # BLD AUTO: 0.53 10*3/MM3 (ref 0.1–0.9)
MONOCYTES NFR BLD AUTO: 8.8 % (ref 5–12)
NEUTROPHILS NFR BLD AUTO: 3.74 10*3/MM3 (ref 1.7–7)
NEUTROPHILS NFR BLD AUTO: 61.9 % (ref 42.7–76)
PLATELET # BLD AUTO: 156 10*3/MM3 (ref 140–450)
PMV BLD AUTO: 11.6 FL (ref 6–12)
POTASSIUM SERPL-SCNC: 4.5 MMOL/L (ref 3.5–5.2)
PROT SERPL-MCNC: 7.2 G/DL (ref 6–8.5)
RBC # BLD AUTO: 4.91 10*6/MM3 (ref 4.14–5.8)
SODIUM SERPL-SCNC: 138 MMOL/L (ref 136–145)
TRIGL SERPL-MCNC: 211 MG/DL (ref 0–150)
VLDLC SERPL-MCNC: 36 MG/DL (ref 5–40)
WBC NRBC COR # BLD: 6.04 10*3/MM3 (ref 3.4–10.8)

## 2022-08-05 NOTE — PROGRESS NOTES
Liver enzymes are elevated.  It looks like they were also elevated approximately 2 years ago.  Has patient ever had liver ultrasound?  Triglycerides have made significant improvement over the last few months.  Continue Zocor.  Rest of the labs look good, awaiting A1c.

## 2022-08-09 DIAGNOSIS — R79.89 ELEVATED LFTS: Primary | ICD-10-CM

## 2022-08-20 ENCOUNTER — HOSPITAL ENCOUNTER (OUTPATIENT)
Dept: ULTRASOUND IMAGING | Facility: HOSPITAL | Age: 62
Discharge: HOME | End: 2022-08-20
Admitting: NURSE PRACTITIONER

## 2022-08-20 DIAGNOSIS — R79.89 ELEVATED LFTS: ICD-10-CM

## 2022-08-20 PROCEDURE — 76705 ECHO EXAM OF ABDOMEN: CPT

## 2022-08-21 NOTE — PROGRESS NOTES
US shows fatty liver. Decrease fried and fatty foods.  Decrease alcohol consumption. If enzymes do not trend downward, will refer to GI

## 2022-08-23 ENCOUNTER — TELEPHONE (OUTPATIENT)
Dept: FAMILY MEDICINE CLINIC | Facility: CLINIC | Age: 62
End: 2022-08-23

## 2022-10-13 RX ORDER — METFORMIN HYDROCHLORIDE 500 MG/1
1000 TABLET, EXTENDED RELEASE ORAL
Qty: 180 TABLET | Refills: 0 | OUTPATIENT
Start: 2022-10-13 | End: 2023-01-11

## 2022-11-21 NOTE — TELEPHONE ENCOUNTER
Caller: Michael Razo    Relationship: Self    Best call back number: [phone]    Requested Prescriptions:   Requested Prescriptions     Pending Prescriptions Disp Refills   • amLODIPine (NORVASC) 10 MG tablet 90 tablet 1     Sig: Take 1 tablet by mouth Daily.   • metFORMIN (Glucophage) 1000 MG tablet 180 tablet 0     Sig: Take 1 tablet by mouth 2 (Two) Times a Day With Meals for 90 days.        Pharmacy where request should be sent: [facility]/PHARMACY #[id number] [place], [place] - [address] - [phone]  - [phone] FX     Additional details provided by patient: PATIENT IS OUT OF MEDICATIONS    Does the patient have less than a 3 day supply:  [x] Yes  [] No    Bonnie Lynch Rep   11/21/22 12:22 EST

## 2022-11-22 RX ORDER — AMLODIPINE BESYLATE 10 MG/1
10 TABLET ORAL DAILY
Qty: 90 TABLET | Refills: 1 | Status: SHIPPED | OUTPATIENT
Start: 2022-11-22 | End: 2023-02-01 | Stop reason: SDUPTHER

## 2022-12-06 ENCOUNTER — OFFICE VISIT (OUTPATIENT)
Dept: FAMILY MEDICINE CLINIC | Facility: CLINIC | Age: 62
End: 2022-12-06

## 2022-12-06 VITALS
SYSTOLIC BLOOD PRESSURE: 144 MMHG | DIASTOLIC BLOOD PRESSURE: 90 MMHG | BODY MASS INDEX: 35.31 KG/M2 | OXYGEN SATURATION: 98 % | WEIGHT: 238.4 LBS | HEIGHT: 69 IN | HEART RATE: 82 BPM

## 2022-12-06 DIAGNOSIS — Z12.11 SCREENING FOR MALIGNANT NEOPLASM OF COLON: ICD-10-CM

## 2022-12-06 DIAGNOSIS — E11.9 TYPE 2 DIABETES MELLITUS WITHOUT COMPLICATION, WITHOUT LONG-TERM CURRENT USE OF INSULIN: Primary | ICD-10-CM

## 2022-12-06 DIAGNOSIS — E78.2 MIXED HYPERLIPIDEMIA: ICD-10-CM

## 2022-12-06 DIAGNOSIS — Z12.5 PROSTATE CANCER SCREENING: ICD-10-CM

## 2022-12-06 DIAGNOSIS — I10 ESSENTIAL HYPERTENSION: ICD-10-CM

## 2022-12-06 LAB — HBA1C MFR BLD: 7.7 % (ref 3.5–5.6)

## 2022-12-06 PROCEDURE — G0103 PSA SCREENING: HCPCS | Performed by: NURSE PRACTITIONER

## 2022-12-06 PROCEDURE — 83036 HEMOGLOBIN GLYCOSYLATED A1C: CPT | Performed by: NURSE PRACTITIONER

## 2022-12-06 PROCEDURE — 99214 OFFICE O/P EST MOD 30 MIN: CPT | Performed by: NURSE PRACTITIONER

## 2022-12-06 PROCEDURE — 80053 COMPREHEN METABOLIC PANEL: CPT | Performed by: NURSE PRACTITIONER

## 2022-12-06 PROCEDURE — 85027 COMPLETE CBC AUTOMATED: CPT | Performed by: NURSE PRACTITIONER

## 2022-12-06 NOTE — ASSESSMENT & PLAN NOTE
1.  Labs reviewed  2.  Continue Zocor as prescribed    The 10-year ASCVD risk score (Price LANGE, et al., 2019) is: 28.7%    Values used to calculate the score:      Age: 62 years      Sex: Male      Is Non- : No      Diabetic: Yes      Tobacco smoker: No      Systolic Blood Pressure: 144 mmHg      Is BP treated: Yes      HDL Cholesterol: 35 mg/dL      Total Cholesterol: 158 mg/dL

## 2022-12-06 NOTE — ASSESSMENT & PLAN NOTE
1.  Blood pressure slightly elevated today  2.  Continue medications as prescribed  3.  Decrease sodium intake  4.  Encourage patient to start exercising, 30 minutes daily or 150 minutes weekly

## 2022-12-06 NOTE — PROGRESS NOTES
"Chief Complaint  Diabetes (4 mo f/u.)    Subjective        Tho SANDHYA Tra presents to [facility]  History of Present Illness    Patient presents for f/u visit.     Patient has hypertension, taking Norvasc 10mg daily and Lisinopril 40mg daily. He is taking Zocor 40mg nightly for hyperlipidemia.     Patient has DMII, taking Metformin BID. Last A1C was 9.3. Patient has occasional polydipsia and polyuria. He denies any neuropathy. Eye exam is not UTD.     Patient referred for Colonoscopy, not yet scheduled.    Objective   Vital Signs:  /90 (BP Location: Left arm, Patient Position: Sitting, Cuff Size: Adult)   Pulse 82   Ht 175.3 cm (69\")   Wt 108 kg (238 lb 6.4 oz)   SpO2 98%   BMI 35.21 kg/m²   Estimated body mass index is 35.21 kg/m² as calculated from the following:    Height as of this encounter: 175.3 cm (69\").    Weight as of this encounter: 108 kg (238 lb 6.4 oz).    Class 2 Severe Obesity (BMI >=35 and <=39.9). Obesity-related health conditions include the following: hypertension and diabetes mellitus. Obesity is unchanged. BMI is is above average; BMI management plan is completed. We discussed portion control and increasing exercise.      Physical Exam  Constitutional:       Appearance: Normal appearance.   HENT:      Head: Normocephalic.   Cardiovascular:      Rate and Rhythm: Normal rate and regular rhythm.   Pulmonary:      Effort: Pulmonary effort is normal.      Breath sounds: Normal breath sounds.   Abdominal:      General: Abdomen is flat. Bowel sounds are normal.      Palpations: Abdomen is soft.   Musculoskeletal:         General: Normal range of motion.      Cervical back: Neck supple.      Right lower leg: No edema.      Left lower leg: No edema.   Skin:     General: Skin is warm and dry.   Neurological:      Mental Status: He is alert and oriented to person, place, and time.      Gait: Gait is intact.   Psychiatric:         Attention and Perception: Attention " normal.         Mood and Affect: Mood normal.         Speech: Speech normal.        Result Review :    CMP    CMP 8/4/22   Glucose 174 (A)   BUN 14   Creatinine 0.93   Sodium 138   Potassium 4.5   Chloride 102   Calcium 8.9   Albumin 4.30   Total Bilirubin 0.3   Alkaline Phosphatase 90   AST (SGOT) 56 (A)   ALT (SGPT) 76 (A)   (A) Abnormal value            CBC    CBC 8/4/22   WBC 6.04   RBC 4.91   Hemoglobin 14.2   Hematocrit 42.7   MCV 87.0   MCH 28.9   MCHC 33.3   RDW 12.9   Platelets 156           Lipid Panel    Lipid Panel 8/4/22   Total Cholesterol 158   Triglycerides 211 (A)   HDL Cholesterol 35 (A)   VLDL Cholesterol 36   LDL Cholesterol  87   LDL/HDL Ratio 2.31   (A) Abnormal value                Most Recent A1C    HGBA1C Most Recent 8/4/22   Hemoglobin A1C 9.30 (A)   (A) Abnormal value                          Assessment and Plan   Diagnoses and all orders for this visit:    1. Type 2 diabetes mellitus without complication, without long-term current use of insulin (HCC) (Primary)  Assessment & Plan:  1.  Continue metformin as prescribed  2.  Discussed ADA diet with patient  3.  Information provided at discharge regarding diet  4.  Refer for diabetic education  5.  Labs today    Orders:  -     CBC (No Diff)  -     Comprehensive Metabolic Panel  -     Hemoglobin A1c  -     Ambulatory Referral to Diabetic Education    2. Essential hypertension  Assessment & Plan:  1.  Blood pressure slightly elevated today  2.  Continue medications as prescribed  3.  Decrease sodium intake  4.  Encourage patient to start exercising, 30 minutes daily or 150 minutes weekly      3. Mixed hyperlipidemia  Assessment & Plan:  1.  Labs reviewed  2.  Continue Zocor as prescribed    The 10-year ASCVD risk score (Price LANGE, et al., 2019) is: 28.7%    Values used to calculate the score:      Age: 62 years      Sex: Male      Is Non- : No      Diabetic: Yes      Tobacco smoker: No      Systolic Blood Pressure: 144  mmHg      Is BP treated: Yes      HDL Cholesterol: 35 mg/dL      Total Cholesterol: 158 mg/dL        4. Prostate cancer screening  -     PSA Screen    5. Screening for malignant neoplasm of colon  Assessment & Plan:  1.  Packet provided for colonoscopy, advised patient to fill out and mail back so that colonoscopy can be scheduled           I spent 30 minutes caring for Tho on this date of service. This time includes time spent by me in the following activities:preparing for the visit, reviewing tests, obtaining and/or reviewing a separately obtained history, performing a medically appropriate examination and/or evaluation , counseling and educating the patient/family/caregiver, ordering medications, tests, or procedures, referring and communicating with other health care professionals , documenting information in the medical record, independently interpreting results and communicating that information with the patient/family/caregiver and care coordination  Follow Up   Return in about 4 months (around 4/6/2023) for DMII.  Patient was given instructions and counseling regarding his condition or for health maintenance advice. Please see specific information pulled into the AVS if appropriate.

## 2022-12-06 NOTE — ASSESSMENT & PLAN NOTE
1.  Continue metformin as prescribed  2.  Discussed ADA diet with patient  3.  Information provided at discharge regarding diet  4.  Refer for diabetic education  5.  Labs today

## 2022-12-06 NOTE — ASSESSMENT & PLAN NOTE
1.  Packet provided for colonoscopy, advised patient to fill out and mail back so that colonoscopy can be scheduled

## 2022-12-07 LAB
ALBUMIN SERPL-MCNC: 4.7 G/DL (ref 3.5–5.2)
ALBUMIN/GLOB SERPL: 1.4 G/DL
ALP SERPL-CCNC: 94 U/L (ref 39–117)
ALT SERPL W P-5'-P-CCNC: 63 U/L (ref 1–41)
ANION GAP SERPL CALCULATED.3IONS-SCNC: 10.6 MMOL/L (ref 5–15)
AST SERPL-CCNC: 37 U/L (ref 1–40)
BILIRUB SERPL-MCNC: 0.2 MG/DL (ref 0–1.2)
BUN SERPL-MCNC: 27 MG/DL (ref 8–23)
BUN/CREAT SERPL: 28.7 (ref 7–25)
CALCIUM SPEC-SCNC: 10 MG/DL (ref 8.6–10.5)
CHLORIDE SERPL-SCNC: 103 MMOL/L (ref 98–107)
CO2 SERPL-SCNC: 26.4 MMOL/L (ref 22–29)
CREAT SERPL-MCNC: 0.94 MG/DL (ref 0.76–1.27)
DEPRECATED RDW RBC AUTO: 40.7 FL (ref 37–54)
EGFRCR SERPLBLD CKD-EPI 2021: 91.1 ML/MIN/1.73
ERYTHROCYTE [DISTWIDTH] IN BLOOD BY AUTOMATED COUNT: 12.9 % (ref 12.3–15.4)
GLOBULIN UR ELPH-MCNC: 3.3 GM/DL
GLUCOSE SERPL-MCNC: 131 MG/DL (ref 65–99)
HCT VFR BLD AUTO: 44 % (ref 37.5–51)
HGB BLD-MCNC: 14.5 G/DL (ref 13–17.7)
MCH RBC QN AUTO: 29.1 PG (ref 26.6–33)
MCHC RBC AUTO-ENTMCNC: 33 G/DL (ref 31.5–35.7)
MCV RBC AUTO: 88.2 FL (ref 79–97)
PLATELET # BLD AUTO: 197 10*3/MM3 (ref 140–450)
PMV BLD AUTO: 11.1 FL (ref 6–12)
POTASSIUM SERPL-SCNC: 4.6 MMOL/L (ref 3.5–5.2)
PROT SERPL-MCNC: 8 G/DL (ref 6–8.5)
PSA SERPL-MCNC: 0.52 NG/ML (ref 0–4)
RBC # BLD AUTO: 4.99 10*6/MM3 (ref 4.14–5.8)
SODIUM SERPL-SCNC: 140 MMOL/L (ref 136–145)
WBC NRBC COR # BLD: 7.82 10*3/MM3 (ref 3.4–10.8)

## 2022-12-07 NOTE — PROGRESS NOTES
Overall, patient's labs look good.  His A1c is down to 7.7 which is much better than it was 4 months ago.  The goal is less than 7.  I referred patient to the [facility] for diabetic education.  I would also recommend adding an oral agent to his metformin if patient is agreeable to help further lower his glucose levels.

## 2022-12-07 NOTE — PROGRESS NOTES
Venipuncture Blood Specimen Collection  Venipuncture performed in the right arm by Ashia Benitez MA with good hemostasis. Patient tolerated the procedure well without complications.   12/07/22   Ashia Benitez MA

## 2023-01-06 ENCOUNTER — TELEPHONE (OUTPATIENT)
Dept: FAMILY MEDICINE CLINIC | Facility: CLINIC | Age: 63
End: 2023-01-06
Payer: COMMERCIAL

## 2023-01-06 NOTE — TELEPHONE ENCOUNTER
LVM asking if filled out green packet and mailed in or if scheduled. Requested a phone call back to let me know.

## 2023-01-06 NOTE — TELEPHONE ENCOUNTER
Spoke with Michael. Stated that he has it half filled out. He will complete it this weekend and mail it in on Monday.

## 2023-01-11 NOTE — TELEPHONE ENCOUNTER
Spoke with Michael. Has turned in Green Packet. Informed that it will take them a few weeks before they call to make colonoscopy appt.

## 2023-02-01 DIAGNOSIS — I10 ESSENTIAL HYPERTENSION: Primary | ICD-10-CM

## 2023-02-01 DIAGNOSIS — E78.2 MIXED HYPERLIPIDEMIA: ICD-10-CM

## 2023-02-01 RX ORDER — AMLODIPINE BESYLATE 10 MG/1
10 TABLET ORAL DAILY
Qty: 90 TABLET | Refills: 1 | Status: SHIPPED | OUTPATIENT
Start: 2023-02-01

## 2023-02-01 RX ORDER — SIMVASTATIN 40 MG
40 TABLET ORAL NIGHTLY
Qty: 90 TABLET | Refills: 1 | Status: SHIPPED | OUTPATIENT
Start: 2023-02-01

## 2023-02-01 RX ORDER — LISINOPRIL 40 MG/1
TABLET ORAL
Qty: 90 TABLET | Refills: 1 | Status: SHIPPED | OUTPATIENT
Start: 2023-02-01

## 2023-03-06 ENCOUNTER — TELEPHONE (OUTPATIENT)
Dept: ENDOCRINOLOGY | Facility: CLINIC | Age: 63
End: 2023-03-06
Payer: COMMERCIAL

## 2023-04-11 ENCOUNTER — OFFICE VISIT (OUTPATIENT)
Dept: FAMILY MEDICINE CLINIC | Facility: CLINIC | Age: 63
End: 2023-04-11
Payer: COMMERCIAL

## 2023-04-11 VITALS
BODY MASS INDEX: 35.84 KG/M2 | DIASTOLIC BLOOD PRESSURE: 80 MMHG | WEIGHT: 242 LBS | SYSTOLIC BLOOD PRESSURE: 132 MMHG | HEART RATE: 62 BPM | OXYGEN SATURATION: 96 % | HEIGHT: 69 IN

## 2023-04-11 DIAGNOSIS — I10 ESSENTIAL HYPERTENSION: ICD-10-CM

## 2023-04-11 DIAGNOSIS — E78.2 MIXED HYPERLIPIDEMIA: ICD-10-CM

## 2023-04-11 DIAGNOSIS — Z00.00 PREVENTATIVE HEALTH CARE: ICD-10-CM

## 2023-04-11 DIAGNOSIS — E11.9 TYPE 2 DIABETES MELLITUS WITHOUT COMPLICATION, WITHOUT LONG-TERM CURRENT USE OF INSULIN: Primary | ICD-10-CM

## 2023-04-11 PROCEDURE — 83036 HEMOGLOBIN GLYCOSYLATED A1C: CPT | Performed by: NURSE PRACTITIONER

## 2023-04-11 PROCEDURE — 80053 COMPREHEN METABOLIC PANEL: CPT | Performed by: NURSE PRACTITIONER

## 2023-04-11 RX ORDER — AMLODIPINE BESYLATE 10 MG/1
10 TABLET ORAL DAILY
Qty: 90 TABLET | Refills: 1 | Status: SHIPPED | OUTPATIENT
Start: 2023-04-11

## 2023-04-11 RX ORDER — SIMVASTATIN 40 MG
40 TABLET ORAL NIGHTLY
Qty: 90 TABLET | Refills: 1 | Status: SHIPPED | OUTPATIENT
Start: 2023-04-11

## 2023-04-11 RX ORDER — LISINOPRIL 40 MG/1
TABLET ORAL
Qty: 90 TABLET | Refills: 1 | Status: SHIPPED | OUTPATIENT
Start: 2023-04-11

## 2023-04-11 NOTE — PROGRESS NOTES
"Chief Complaint  Follow-up (4 month follow up DM )    Subjective        Kyreeo SANDHYA Razo presents to [facility]  History of Present Illness    Patient presents for f/u visit.    Patient has hypertension, taking Norvasc 10mg daily and Lisinopril 40mg daily. He is taking Zocor 40mg nightly for hyperlipidemia. Patient denies dizziness, headache, chest pain, cough or dyspnea.     Patient has DMII, taking Metformin 1 g BID. Last A1C was 7.7. He denies any polyuria, polydipsia or polyphagia. Patient has no c/o neuropathy. Eye exam is not UTD.  Patient does not monitor glucose levels due to fear of needles.  He does not follow strict ADA diet.    Colonoscopy scheduled for May.     Objective   Vital Signs:  /80 (BP Location: Left arm, Patient Position: Sitting, Cuff Size: Large Adult)   Pulse 62   Ht 175.3 cm (69\")   Wt 110 kg (242 lb)   SpO2 96%   BMI 35.74 kg/m²   Estimated body mass index is 35.74 kg/m² as calculated from the following:    Height as of this encounter: 175.3 cm (69\").    Weight as of this encounter: 110 kg (242 lb).             Physical Exam  Constitutional:       Appearance: Normal appearance.   HENT:      Head: Normocephalic.   Cardiovascular:      Rate and Rhythm: Normal rate and regular rhythm.   Pulmonary:      Effort: Pulmonary effort is normal.      Breath sounds: Normal breath sounds.   Abdominal:      General: Abdomen is flat. Bowel sounds are normal.      Palpations: Abdomen is soft.   Musculoskeletal:         General: Normal range of motion.      Cervical back: Neck supple.      Right lower leg: No edema.      Left lower leg: No edema.   Skin:     General: Skin is warm and dry.   Neurological:      Mental Status: He is alert and oriented to person, place, and time.      Gait: Gait is intact.   Psychiatric:         Attention and Perception: Attention normal.         Mood and Affect: Mood normal.         Speech: Speech normal.        Result Review :    CMP       "  8/4/2022    09:03 12/6/2022    16:02   CMP   Glucose 174   131     BUN 14   27     Creatinine 0.93   0.94     EGFR 92.3   91.1     Sodium 138   140     Potassium 4.5   4.6     Chloride 102   103     Calcium 8.9   10.0     Total Protein 7.2   8.0     Albumin 4.30   4.70     Globulin 2.9   3.3     Total Bilirubin 0.3   0.2     Alkaline Phosphatase 90   94     AST (SGOT) 56   37     ALT (SGPT) 76   63     Albumin/Globulin Ratio 1.5   1.4     BUN/Creatinine Ratio 15.1   28.7     Anion Gap 11.9   10.6       CBC        8/4/2022    09:03 12/6/2022    16:02   CBC   WBC 6.04   7.82     RBC 4.91   4.99     Hemoglobin 14.2   14.5     Hematocrit 42.7   44.0     MCV 87.0   88.2     MCH 28.9   29.1     MCHC 33.3   33.0     RDW 12.9   12.9     Platelets 156   197       Lipid Panel        8/4/2022    09:03   Lipid Panel   Total Cholesterol 158     Triglycerides 211     HDL Cholesterol 35     VLDL Cholesterol 36     LDL Cholesterol  87     LDL/HDL Ratio 2.31           Most Recent A1C        12/6/2022    16:02   HGBA1C Most Recent   Hemoglobin A1C 7.7                    Assessment and Plan   Diagnoses and all orders for this visit:    1. Type 2 diabetes mellitus without complication, without long-term current use of insulin (Primary)  Assessment & Plan:  1.  Continue metformin as prescribed  2.  ADA diet  3.  Recommended annual eye exam  4.  Labs today    Orders:  -     Hemoglobin A1c  -     Comprehensive Metabolic Panel    2. Essential hypertension  Assessment & Plan:  1.  Blood pressure at goal of less than 140/90  2.  Continue medications as prescribed    Orders:  -     lisinopril (PRINIVIL,ZESTRIL) 40 MG tablet; TAKE 1 TABLET DAILY  Dispense: 90 tablet; Refill: 1  -     amLODIPine (NORVASC) 10 MG tablet; Take 1 tablet by mouth Daily.  Dispense: 90 tablet; Refill: 1    3. Mixed hyperlipidemia  Assessment & Plan:  1.  Continue Zocor as prescribed  2.  Labs reviewed    Orders:  -     simvastatin (ZOCOR) 40 MG tablet; Take 1 tablet  by mouth Every Night.  Dispense: 90 tablet; Refill: 1    4. Preventative health care  Assessment & Plan:  1.  Colonoscopy is scheduled for May      Other orders  -     metFORMIN (GLUCOPHAGE) 1000 MG tablet; Take 1 tablet by mouth 2 (Two) Times a Day With Meals.  Dispense: 180 tablet; Refill: 1         I spent 25 minutes caring for Tho on this date of service. This time includes time spent by me in the following activities:preparing for the visit, reviewing tests, obtaining and/or reviewing a separately obtained history, performing a medically appropriate examination and/or evaluation , counseling and educating the patient/family/caregiver, ordering medications, tests, or procedures, documenting information in the medical record and care coordination  Follow Up   Return in about 4 months (around 8/11/2023) for DMII/HTN.  Patient was given instructions and counseling regarding his condition or for health maintenance advice. Please see specific information pulled into the AVS if appropriate.       Answers for HPI/ROS submitted by the patient on 4/4/2023  What is the primary reason for your visit?: Diabetes

## 2023-04-11 NOTE — PROGRESS NOTES
Venipuncture Blood Specimen Collection  Venipuncture performed in the left arm by Ashia Benitez MA with good hemostasis. Patient tolerated the procedure well without complications.   04/11/23   Ashia Benitez MA

## 2023-04-11 NOTE — ASSESSMENT & PLAN NOTE
1.  Continue metformin as prescribed  2.  ADA diet  3.  Recommended annual eye exam  4.  Labs today

## 2023-04-12 LAB
ALBUMIN SERPL-MCNC: 4.4 G/DL (ref 3.5–5.2)
ALBUMIN/GLOB SERPL: 1.5 G/DL
ALP SERPL-CCNC: 84 U/L (ref 39–117)
ALT SERPL W P-5'-P-CCNC: 49 U/L (ref 1–41)
ANION GAP SERPL CALCULATED.3IONS-SCNC: 7.2 MMOL/L (ref 5–15)
AST SERPL-CCNC: 30 U/L (ref 1–40)
BILIRUB SERPL-MCNC: 0.3 MG/DL (ref 0–1.2)
BUN SERPL-MCNC: 22 MG/DL (ref 8–23)
BUN/CREAT SERPL: 19.6 (ref 7–25)
CALCIUM SPEC-SCNC: 9.4 MG/DL (ref 8.6–10.5)
CHLORIDE SERPL-SCNC: 106 MMOL/L (ref 98–107)
CO2 SERPL-SCNC: 26.8 MMOL/L (ref 22–29)
CREAT SERPL-MCNC: 1.12 MG/DL (ref 0.76–1.27)
EGFRCR SERPLBLD CKD-EPI 2021: 73.4 ML/MIN/1.73
GLOBULIN UR ELPH-MCNC: 2.9 GM/DL
GLUCOSE SERPL-MCNC: 183 MG/DL (ref 65–99)
HBA1C MFR BLD: 8.2 % (ref 4.8–5.6)
POTASSIUM SERPL-SCNC: 4.1 MMOL/L (ref 3.5–5.2)
PROT SERPL-MCNC: 7.3 G/DL (ref 6–8.5)
SODIUM SERPL-SCNC: 140 MMOL/L (ref 136–145)

## 2023-04-12 NOTE — PROGRESS NOTES
Patient's A1c is up to 8.2.  My recommendation, in addition to decreasing carbohydrate intake, is to add another pill to his regimen to help further lower the A1c.

## 2023-05-08 DIAGNOSIS — I10 ESSENTIAL HYPERTENSION: ICD-10-CM

## 2023-05-08 DIAGNOSIS — E78.2 MIXED HYPERLIPIDEMIA: ICD-10-CM

## 2023-05-08 RX ORDER — LISINOPRIL 40 MG/1
TABLET ORAL
Qty: 90 TABLET | Refills: 1 | Status: SHIPPED | OUTPATIENT
Start: 2023-05-08

## 2023-05-08 RX ORDER — AMLODIPINE BESYLATE 10 MG/1
10 TABLET ORAL DAILY
Qty: 90 TABLET | Refills: 1 | Status: SHIPPED | OUTPATIENT
Start: 2023-05-08

## 2023-05-08 RX ORDER — SIMVASTATIN 40 MG
40 TABLET ORAL NIGHTLY
Qty: 90 TABLET | Refills: 1 | Status: SHIPPED | OUTPATIENT
Start: 2023-05-08

## 2023-05-08 NOTE — TELEPHONE ENCOUNTER
Caller: Michael Razo    Relationship: Self    Best call back number: [phone]  Requested Prescriptions:   Requested Prescriptions     Pending Prescriptions Disp Refills   • simvastatin (ZOCOR) 40 MG tablet 90 tablet 1     Sig: Take 1 tablet by mouth Every Night.   • amLODIPine (NORVASC) 10 MG tablet 90 tablet 1     Sig: Take 1 tablet by mouth Daily.   • lisinopril (PRINIVIL,ZESTRIL) 40 MG tablet 90 tablet 1     Sig: TAKE 1 TABLET DAILY        Pharmacy where request should be sent: [facility] #[id number] - [place], [place] - [address] - [phone]  - [phone] FX     Last office visit with prescribing clinician: 4/11/2023   Last telemedicine visit with prescribing clinician: Visit date not found   Next office visit with prescribing clinician: Visit date not found     Additional details provided by patient: PATIENT IS OUT OF MEDICATION.    Does the patient have less than a 3 day supply:  [x] Yes  [] No    Would you like a call back once the refill request has been completed: [x] Yes [] No    If the office needs to give you a call back, can they leave a voicemail: [x] Yes [] No    PLEASE ADVISE.    Bonnie Crespo Rep   05/08/23 08:52 EDT

## 2023-05-18 ENCOUNTER — ON CAMPUS - OUTPATIENT (OUTPATIENT)
Dept: URBAN - METROPOLITAN AREA HOSPITAL 2 | Facility: HOSPITAL | Age: 63
End: 2023-05-18
Payer: COMMERCIAL

## 2023-05-18 ENCOUNTER — OFFICE (OUTPATIENT)
Dept: URBAN - METROPOLITAN AREA PATHOLOGY 4 | Facility: PATHOLOGY | Age: 63
End: 2023-05-18
Payer: COMMERCIAL

## 2023-05-18 VITALS
HEART RATE: 57 BPM | DIASTOLIC BLOOD PRESSURE: 90 MMHG | OXYGEN SATURATION: 96 % | DIASTOLIC BLOOD PRESSURE: 63 MMHG | WEIGHT: 234 LBS | SYSTOLIC BLOOD PRESSURE: 108 MMHG | DIASTOLIC BLOOD PRESSURE: 83 MMHG | TEMPERATURE: 98.2 F | HEART RATE: 61 BPM | SYSTOLIC BLOOD PRESSURE: 107 MMHG | DIASTOLIC BLOOD PRESSURE: 95 MMHG | RESPIRATION RATE: 17 BRPM | SYSTOLIC BLOOD PRESSURE: 127 MMHG | SYSTOLIC BLOOD PRESSURE: 140 MMHG | HEIGHT: 69 IN | SYSTOLIC BLOOD PRESSURE: 106 MMHG | HEART RATE: 64 BPM | HEART RATE: 56 BPM | RESPIRATION RATE: 16 BRPM | RESPIRATION RATE: 18 BRPM | DIASTOLIC BLOOD PRESSURE: 68 MMHG | SYSTOLIC BLOOD PRESSURE: 102 MMHG | OXYGEN SATURATION: 95 % | HEART RATE: 58 BPM | RESPIRATION RATE: 19 BRPM | DIASTOLIC BLOOD PRESSURE: 62 MMHG

## 2023-05-18 DIAGNOSIS — Z12.11 ENCOUNTER FOR SCREENING FOR MALIGNANT NEOPLASM OF COLON: ICD-10-CM

## 2023-05-18 DIAGNOSIS — D12.8 BENIGN NEOPLASM OF RECTUM: ICD-10-CM

## 2023-05-18 DIAGNOSIS — D12.3 BENIGN NEOPLASM OF TRANSVERSE COLON: ICD-10-CM

## 2023-05-18 DIAGNOSIS — D12.2 BENIGN NEOPLASM OF ASCENDING COLON: ICD-10-CM

## 2023-05-18 DIAGNOSIS — D12.0 BENIGN NEOPLASM OF CECUM: ICD-10-CM

## 2023-05-18 PROBLEM — K63.5 POLYP OF COLON: Status: ACTIVE | Noted: 2023-05-18

## 2023-05-18 PROBLEM — D17.79 BENIGN LIPOMATOUS NEOPLASM OF OTHER SITES: Status: ACTIVE | Noted: 2023-05-18

## 2023-05-18 PROBLEM — K62.1 RECTAL POLYP: Status: ACTIVE | Noted: 2023-05-18

## 2023-05-18 LAB
GI HISTOLOGY: A. UNSPECIFIED: (no result)
GI HISTOLOGY: B. UNSPECIFIED: (no result)
GI HISTOLOGY: C. UNSPECIFIED: (no result)
GI HISTOLOGY: D. UNSPECIFIED: (no result)
GI HISTOLOGY: PDF REPORT: (no result)

## 2023-05-18 PROCEDURE — 45380 COLONOSCOPY AND BIOPSY: CPT | Mod: 59,33 | Performed by: INTERNAL MEDICINE

## 2023-05-18 PROCEDURE — 45380 COLONOSCOPY AND BIOPSY: CPT | Mod: 33,59 | Performed by: INTERNAL MEDICINE

## 2023-05-18 PROCEDURE — 45385 COLONOSCOPY W/LESION REMOVAL: CPT | Mod: 33 | Performed by: INTERNAL MEDICINE

## 2023-05-18 PROCEDURE — 88305 TISSUE EXAM BY PATHOLOGIST: CPT | Performed by: INTERNAL MEDICINE

## 2023-07-25 NOTE — TELEPHONE ENCOUNTER
Caller: Michael Razo    Relationship: Self    Best call back number: [phone]     Requested Prescriptions:   Requested Prescriptions     Pending Prescriptions Disp Refills    metFORMIN (GLUCOPHAGE) 1000 MG tablet 180 tablet 1     Sig: Take 1 tablet by mouth 2 (Two) Times a Day With Meals.        Pharmacy where request should be sent: [id number] - [facility] [address] - [phone]  - [phone] FX     Last office visit with prescribing clinician: 4/11/2023   Last telemedicine visit with prescribing clinician: Visit date not found   Next office visit with prescribing clinician: Visit date not found     Additional details provided by patient: PATIENT HAS A 2 DAY SUPPLY LEFT     Does the patient have less than a 3 day supply:  [x] Yes  [] No    Would you like a call back once the refill request has been completed: [] Yes [x] No    If the office needs to give you a call back, can they leave a voicemail: [] Yes [x] No    Bonnie Carlisle Rep   07/25/23 13:24 EDT

## 2024-01-17 DIAGNOSIS — I10 ESSENTIAL HYPERTENSION: ICD-10-CM

## 2024-01-17 DIAGNOSIS — E78.2 MIXED HYPERLIPIDEMIA: ICD-10-CM

## 2024-01-17 RX ORDER — SIMVASTATIN 40 MG
40 TABLET ORAL
Qty: 90 TABLET | Refills: 0 | Status: SHIPPED | OUTPATIENT
Start: 2024-01-17

## 2024-01-17 RX ORDER — AMLODIPINE BESYLATE 10 MG/1
10 TABLET ORAL DAILY
Qty: 90 TABLET | Refills: 0 | Status: SHIPPED | OUTPATIENT
Start: 2024-01-17

## 2024-01-17 RX ORDER — LISINOPRIL 40 MG/1
TABLET ORAL
Qty: 90 TABLET | Refills: 0 | Status: SHIPPED | OUTPATIENT
Start: 2024-01-17

## 2024-04-11 ENCOUNTER — OFFICE VISIT (OUTPATIENT)
Dept: FAMILY MEDICINE CLINIC | Facility: CLINIC | Age: 64
End: 2024-04-11
Payer: COMMERCIAL

## 2024-04-11 VITALS
BODY MASS INDEX: 35.84 KG/M2 | SYSTOLIC BLOOD PRESSURE: 128 MMHG | HEIGHT: 69 IN | WEIGHT: 242 LBS | HEART RATE: 68 BPM | OXYGEN SATURATION: 98 % | DIASTOLIC BLOOD PRESSURE: 78 MMHG

## 2024-04-11 DIAGNOSIS — E11.65 TYPE 2 DIABETES MELLITUS WITH HYPERGLYCEMIA, WITHOUT LONG-TERM CURRENT USE OF INSULIN: ICD-10-CM

## 2024-04-11 DIAGNOSIS — E78.2 MIXED HYPERLIPIDEMIA: Primary | ICD-10-CM

## 2024-04-11 DIAGNOSIS — I10 ESSENTIAL HYPERTENSION: ICD-10-CM

## 2024-04-11 PROCEDURE — 99214 OFFICE O/P EST MOD 30 MIN: CPT | Performed by: NURSE PRACTITIONER

## 2024-04-11 NOTE — ASSESSMENT & PLAN NOTE
Labs reviewed, uncontrolled  Strict ADA diet will meet with dietitian on Monday  Start taking metformin twice daily as prescribed  Recommended eye exam

## 2024-04-11 NOTE — PROGRESS NOTES
"Chief Complaint  Follow-up (Follow up DM )    Subjective        Michael Razo presents to [facility]  History of Present Illness    Patient presents for follow-up visit, last seen on 4/11/23.    Uncontrolled diabetes, prescribed metformin 1 g twice daily.  A1c drawn at employer lab, noted to be 10.5. Patient reports he has only been taking 500 mg daily for the last 3 months. He is scheduled to meet with dietician Monday. Patient reports he has been seeing NP at his place of work, completed labs that are in chart.     Hypertension stable on lisinopril 40 mg daily and amlodipine 10 mg daily.  Patient is taking Zocor 40 mg nightly for hyperlipidemia.    Advance Care Planning   ACP discussion was held with the patient during this visit. Patient does not have an advance directive, information provided.        Objective   Vital Signs:  /78 (BP Location: Left arm, Patient Position: Sitting, Cuff Size: Large Adult)   Pulse 68   Ht 175.3 cm (69\")   Wt 110 kg (242 lb)   SpO2 98%   BMI 35.74 kg/m²   Estimated body mass index is 35.74 kg/m² as calculated from the following:    Height as of this encounter: 175.3 cm (69\").    Weight as of this encounter: 110 kg (242 lb).       Class 2 Severe Obesity (BMI >=35 and <=39.9). Obesity-related health conditions include the following: hypertension, diabetes mellitus, and dyslipidemias. Obesity is unchanged. BMI is is above average; BMI management plan is completed. We discussed portion control and increasing exercise.      Physical Exam  Constitutional:       Appearance: Normal appearance.   HENT:      Head: Normocephalic.   Cardiovascular:      Rate and Rhythm: Normal rate and regular rhythm.   Pulmonary:      Effort: Pulmonary effort is normal.      Breath sounds: Normal breath sounds.   Abdominal:      General: Abdomen is flat. Bowel sounds are normal.      Palpations: Abdomen is soft.   Musculoskeletal:         General: Normal range of motion. "      Cervical back: Neck supple.      Right lower leg: No edema.      Left lower leg: No edema.   Skin:     General: Skin is warm and dry.   Neurological:      Mental Status: He is alert and oriented to person, place, and time.      Gait: Gait is intact.   Psychiatric:         Attention and Perception: Attention normal.         Mood and Affect: Mood normal.         Speech: Speech normal.        Result Review :                     Assessment and Plan     Diagnoses and all orders for this visit:    1. Mixed hyperlipidemia (Primary)  Assessment & Plan:   1.  Labs reviewed  2.  Continue Zocor as prescribed        2. Essential hypertension  Assessment & Plan:  At goal of less than 140/90  Continue medications as prescribed      3. Type 2 diabetes mellitus with hyperglycemia, without long-term current use of insulin  Assessment & Plan:  Labs reviewed, uncontrolled  Strict ADA diet will meet with dietitian on Monday  Start taking metformin twice daily as prescribed  Recommended eye exam             I spent 30 minutes caring for Tho on this date of service. This time includes time spent by me in the following activities:preparing for the visit, reviewing tests, obtaining and/or reviewing a separately obtained history, performing a medically appropriate examination and/or evaluation , counseling and educating the patient/family/caregiver, ordering medications, tests, or procedures, documenting information in the medical record, and care coordination  Follow Up     Return in about 6 months (around 10/11/2024).  Patient was given instructions and counseling regarding his condition or for health maintenance advice. Please see specific information pulled into the AVS if appropriate.

## 2024-04-15 DIAGNOSIS — I10 ESSENTIAL HYPERTENSION: ICD-10-CM

## 2024-04-15 DIAGNOSIS — E78.2 MIXED HYPERLIPIDEMIA: ICD-10-CM

## 2024-04-15 RX ORDER — LISINOPRIL 40 MG/1
TABLET ORAL
Qty: 90 TABLET | Refills: 0 | Status: SHIPPED | OUTPATIENT
Start: 2024-04-15

## 2024-04-15 RX ORDER — SIMVASTATIN 40 MG
40 TABLET ORAL
Qty: 90 TABLET | Refills: 0 | Status: SHIPPED | OUTPATIENT
Start: 2024-04-15

## 2024-04-15 RX ORDER — AMLODIPINE BESYLATE 10 MG/1
10 TABLET ORAL DAILY
Qty: 90 TABLET | Refills: 0 | Status: SHIPPED | OUTPATIENT
Start: 2024-04-15

## 2024-07-03 DIAGNOSIS — K76.0 HEPATIC STEATOSIS: Primary | ICD-10-CM

## 2024-07-03 DIAGNOSIS — R74.8 ELEVATED LIVER ENZYMES: ICD-10-CM

## 2024-07-05 ENCOUNTER — TELEPHONE (OUTPATIENT)
Dept: FAMILY MEDICINE CLINIC | Facility: CLINIC | Age: 64
End: 2024-07-05
Payer: COMMERCIAL

## 2024-07-10 DIAGNOSIS — I10 ESSENTIAL HYPERTENSION: ICD-10-CM

## 2024-07-10 DIAGNOSIS — E78.2 MIXED HYPERLIPIDEMIA: ICD-10-CM

## 2024-07-10 RX ORDER — AMLODIPINE BESYLATE 10 MG/1
10 TABLET ORAL DAILY
Qty: 90 TABLET | Refills: 0 | Status: SHIPPED | OUTPATIENT
Start: 2024-07-10

## 2024-07-10 RX ORDER — SIMVASTATIN 40 MG
40 TABLET ORAL
Qty: 90 TABLET | Refills: 0 | Status: SHIPPED | OUTPATIENT
Start: 2024-07-10

## 2024-07-10 RX ORDER — LISINOPRIL 40 MG/1
TABLET ORAL
Qty: 90 TABLET | Refills: 0 | Status: SHIPPED | OUTPATIENT
Start: 2024-07-10

## 2024-09-10 ENCOUNTER — OFFICE (OUTPATIENT)
Age: 64
End: 2024-09-10

## 2024-09-10 ENCOUNTER — OFFICE (OUTPATIENT)
Dept: URBAN - METROPOLITAN AREA CLINIC 64 | Facility: CLINIC | Age: 64
End: 2024-09-10

## 2024-09-10 VITALS
DIASTOLIC BLOOD PRESSURE: 94 MMHG | WEIGHT: 252 LBS | SYSTOLIC BLOOD PRESSURE: 152 MMHG | HEIGHT: 69 IN | SYSTOLIC BLOOD PRESSURE: 152 MMHG | DIASTOLIC BLOOD PRESSURE: 94 MMHG | HEIGHT: 69 IN | WEIGHT: 252 LBS | WEIGHT: 252 LBS | WEIGHT: 252 LBS | HEART RATE: 62 BPM | HEART RATE: 62 BPM | SYSTOLIC BLOOD PRESSURE: 152 MMHG | SYSTOLIC BLOOD PRESSURE: 152 MMHG | HEIGHT: 69 IN | SYSTOLIC BLOOD PRESSURE: 152 MMHG | WEIGHT: 252 LBS | HEART RATE: 62 BPM | DIASTOLIC BLOOD PRESSURE: 94 MMHG | HEIGHT: 69 IN | DIASTOLIC BLOOD PRESSURE: 94 MMHG | DIASTOLIC BLOOD PRESSURE: 94 MMHG | HEART RATE: 62 BPM | WEIGHT: 252 LBS | HEART RATE: 62 BPM | HEIGHT: 69 IN | HEART RATE: 62 BPM | SYSTOLIC BLOOD PRESSURE: 152 MMHG | WEIGHT: 252 LBS | HEIGHT: 69 IN | HEART RATE: 62 BPM | SYSTOLIC BLOOD PRESSURE: 152 MMHG | DIASTOLIC BLOOD PRESSURE: 94 MMHG | HEIGHT: 69 IN | DIASTOLIC BLOOD PRESSURE: 94 MMHG

## 2024-09-10 DIAGNOSIS — K76.0 FATTY (CHANGE OF) LIVER, NOT ELSEWHERE CLASSIFIED: ICD-10-CM

## 2024-09-10 DIAGNOSIS — R74.01 ELEVATION OF LEVELS OF LIVER TRANSAMINASE LEVELS: ICD-10-CM

## 2024-09-10 DIAGNOSIS — Z86.010 PERSONAL HISTORY OF COLON POLYPS: ICD-10-CM

## 2024-09-10 PROCEDURE — 99214 OFFICE O/P EST MOD 30 MIN: CPT

## 2024-09-12 ENCOUNTER — TRANSCRIBE ORDERS (OUTPATIENT)
Dept: ADMINISTRATIVE | Facility: HOSPITAL | Age: 64
End: 2024-09-12
Payer: COMMERCIAL

## 2024-09-12 DIAGNOSIS — K76.0 HEPATIC STEATOSIS: ICD-10-CM

## 2024-09-12 DIAGNOSIS — Z86.010 PERSONAL HISTORY OF COLONIC POLYPS: ICD-10-CM

## 2024-09-12 DIAGNOSIS — R74.01 TRANSAMINITIS: Primary | ICD-10-CM

## 2024-09-18 ENCOUNTER — HOSPITAL ENCOUNTER (OUTPATIENT)
Dept: ULTRASOUND IMAGING | Facility: HOSPITAL | Age: 64
Discharge: HOME | End: 2024-09-18
Payer: COMMERCIAL

## 2024-09-18 DIAGNOSIS — K76.0 HEPATIC STEATOSIS: ICD-10-CM

## 2024-09-18 DIAGNOSIS — R74.01 TRANSAMINITIS: ICD-10-CM

## 2024-09-18 DIAGNOSIS — Z86.010 PERSONAL HISTORY OF COLONIC POLYPS: ICD-10-CM

## 2024-09-18 PROCEDURE — 76705 ECHO EXAM OF ABDOMEN: CPT

## 2024-10-06 DIAGNOSIS — I10 ESSENTIAL HYPERTENSION: ICD-10-CM

## 2024-10-06 DIAGNOSIS — E78.2 MIXED HYPERLIPIDEMIA: ICD-10-CM

## 2024-10-07 RX ORDER — AMLODIPINE BESYLATE 10 MG/1
10 TABLET ORAL DAILY
Qty: 90 TABLET | Refills: 0 | Status: SHIPPED | OUTPATIENT
Start: 2024-10-07

## 2024-10-07 RX ORDER — SIMVASTATIN 40 MG
40 TABLET ORAL
Qty: 90 TABLET | Refills: 0 | Status: SHIPPED | OUTPATIENT
Start: 2024-10-07

## 2024-10-07 RX ORDER — LISINOPRIL 40 MG/1
TABLET ORAL
Qty: 90 TABLET | Refills: 0 | Status: SHIPPED | OUTPATIENT
Start: 2024-10-07

## 2024-10-16 ENCOUNTER — OFFICE VISIT (OUTPATIENT)
Dept: FAMILY MEDICINE CLINIC | Facility: CLINIC | Age: 64
End: 2024-10-16
Payer: COMMERCIAL

## 2024-10-16 VITALS
OXYGEN SATURATION: 95 % | BODY MASS INDEX: 36.14 KG/M2 | SYSTOLIC BLOOD PRESSURE: 128 MMHG | DIASTOLIC BLOOD PRESSURE: 80 MMHG | WEIGHT: 244 LBS | HEIGHT: 69 IN | HEART RATE: 65 BPM

## 2024-10-16 DIAGNOSIS — K76.0 HEPATIC STEATOSIS: ICD-10-CM

## 2024-10-16 DIAGNOSIS — E78.2 MIXED HYPERLIPIDEMIA: Primary | ICD-10-CM

## 2024-10-16 DIAGNOSIS — I10 ESSENTIAL HYPERTENSION: ICD-10-CM

## 2024-10-16 DIAGNOSIS — E11.65 TYPE 2 DIABETES MELLITUS WITH HYPERGLYCEMIA, WITHOUT LONG-TERM CURRENT USE OF INSULIN: ICD-10-CM

## 2024-10-16 PROCEDURE — 99213 OFFICE O/P EST LOW 20 MIN: CPT | Performed by: NURSE PRACTITIONER

## 2024-10-16 PROCEDURE — 82043 UR ALBUMIN QUANTITATIVE: CPT | Performed by: NURSE PRACTITIONER

## 2024-10-16 NOTE — PROGRESS NOTES
"Chief Complaint  Follow-up (6 month follow up DM )    Subjective        Michael Razo presents to [facility]  History of Present Illness    Patient presents for f/u visit.    Hypertension stable on Lisinopril 40 mg daily and amlodipine 10 mg daily. Patient is taking Zocor 40 mg nightly for hyperlipidemia.     Patient is taking Metformin 1 gm BID for type II diabetes. A1C in July was 7.6. Patient reports seen by Ashley Torres this AM for lab work.     Hepatic steatosis, followed by GI.     Objective   Vital Signs:  /80 (BP Location: Left arm, Patient Position: Sitting, Cuff Size: Large Adult)   Pulse 65   Ht 175.3 cm (69\")   Wt 111 kg (244 lb)   SpO2 95%   BMI 36.03 kg/m²   Estimated body mass index is 36.03 kg/m² as calculated from the following:    Height as of this encounter: 175.3 cm (69\").    Weight as of this encounter: 111 kg (244 lb).          Physical Exam  Constitutional:       Appearance: Normal appearance.   HENT:      Head: Normocephalic.   Cardiovascular:      Rate and Rhythm: Normal rate and regular rhythm.   Pulmonary:      Effort: Pulmonary effort is normal.      Breath sounds: Normal breath sounds.   Abdominal:      General: Abdomen is flat. Bowel sounds are normal.      Palpations: Abdomen is soft.   Musculoskeletal:         General: Normal range of motion.      Cervical back: Neck supple.      Right lower leg: No edema.      Left lower leg: No edema.   Skin:     General: Skin is warm and dry.   Neurological:      Mental Status: He is alert and oriented to person, place, and time.      Gait: Gait is intact.   Psychiatric:         Attention and Perception: Attention normal.         Mood and Affect: Mood normal.         Speech: Speech normal.        Result Review :                        Assessment and Plan   Diagnoses and all orders for this visit:    1. Mixed hyperlipidemia (Primary)  Assessment & Plan:  Labs reviewed  Continue Zocor as prescribed  Healthy heart " diet      2. Type 2 diabetes mellitus with hyperglycemia, without long-term current use of insulin  Assessment & Plan:  ADA diet  Labs drawn today, will await for results to be faxed  Annual eye exam recommended  Continue Metformin as prescribed    Orders:  -     MicroAlbumin, Urine, Random - Urine, Clean Catch  -     Ambulatory Referral to Ophthalmology    3. Essential hypertension  Assessment & Plan:  At goal of < 140/90  Continue medications as prescribed      4. Hepatic steatosis  Assessment & Plan:  Follow up with GI  US reviewed             I spent 25 minutes caring for Tho on this date of service. This time includes time spent by me in the following activities:preparing for the visit, reviewing tests, obtaining and/or reviewing a separately obtained history, performing a medically appropriate examination and/or evaluation , counseling and educating the patient/family/caregiver, ordering medications, tests, or procedures, documenting information in the medical record, and care coordination  Follow Up   Return in about 6 months (around 4/16/2025) for Annual physical.  Patient was given instructions and counseling regarding his condition or for health maintenance advice. Please see specific information pulled into the AVS if appropriate.

## 2024-10-16 NOTE — ASSESSMENT & PLAN NOTE
ADA diet  Labs drawn today, will await for results to be faxed  Annual eye exam recommended  Continue Metformin as prescribed

## 2024-10-17 LAB — ALBUMIN UR-MCNC: 1.4 MG/DL

## 2024-12-30 ENCOUNTER — OFFICE (OUTPATIENT)
Dept: URBAN - METROPOLITAN AREA CLINIC 64 | Facility: CLINIC | Age: 64
End: 2024-12-30
Payer: COMMERCIAL

## 2024-12-30 VITALS
HEIGHT: 69 IN | WEIGHT: 242 LBS | SYSTOLIC BLOOD PRESSURE: 125 MMHG | DIASTOLIC BLOOD PRESSURE: 74 MMHG | HEART RATE: 69 BPM

## 2024-12-30 DIAGNOSIS — K76.0 FATTY (CHANGE OF) LIVER, NOT ELSEWHERE CLASSIFIED: ICD-10-CM

## 2024-12-30 DIAGNOSIS — R74.8 ABNORMAL LEVELS OF OTHER SERUM ENZYMES: ICD-10-CM

## 2024-12-30 PROCEDURE — 99213 OFFICE O/P EST LOW 20 MIN: CPT

## 2025-01-04 DIAGNOSIS — I10 ESSENTIAL HYPERTENSION: ICD-10-CM

## 2025-01-04 DIAGNOSIS — E78.2 MIXED HYPERLIPIDEMIA: ICD-10-CM

## 2025-01-07 RX ORDER — LISINOPRIL 40 MG/1
TABLET ORAL
Qty: 90 TABLET | Refills: 0 | Status: SHIPPED | OUTPATIENT
Start: 2025-01-07

## 2025-01-07 RX ORDER — AMLODIPINE BESYLATE 10 MG/1
10 TABLET ORAL DAILY
Qty: 90 TABLET | Refills: 0 | Status: SHIPPED | OUTPATIENT
Start: 2025-01-07

## 2025-01-07 RX ORDER — SIMVASTATIN 40 MG
40 TABLET ORAL
Qty: 90 TABLET | Refills: 0 | Status: SHIPPED | OUTPATIENT
Start: 2025-01-07

## 2025-01-28 ENCOUNTER — TELEPHONE (OUTPATIENT)
Dept: FAMILY MEDICINE CLINIC | Facility: CLINIC | Age: 65
End: 2025-01-28
Payer: COMMERCIAL

## 2025-01-28 NOTE — TELEPHONE ENCOUNTER
Ashley a nurse practitioner at his employer saw him today and is advising starting Januvia in addition to his metformin. She wanted to call and see if that was okay with you before starting him on this. She stated they are limited as they cannot do next step to GLP-1's until they fail other medications. She asked to let her know if there are any concerns with adding this to his medications. She can be reached at [phone].

## 2025-01-28 NOTE — TELEPHONE ENCOUNTER
Ashley notified and expressed understanding. They will recheck labs in 3 months and fax results to the office.

## 2025-03-24 ENCOUNTER — TELEPHONE (OUTPATIENT)
Dept: FAMILY MEDICINE CLINIC | Facility: CLINIC | Age: 65
End: 2025-03-24

## 2025-03-31 NOTE — TELEPHONE ENCOUNTER
Caller: Michael Razo    Relationship to patient: Self    Best call back number: [phone]    Patient is needing:     STATES THAT THE HE THINKS THE NEW MEDICATION THAT HE WAS STARTED ON IS CAUSING HIM TO HAVE KNEE AND ELBOW JOINT PAIN.     HE WOULD LIKE TO KNOW IF THAT MEDICATION COULD CAUSE THAT, AND IF SO, HE WOULD LIKE TO TRY AN ALTERNATIVE.     (HE COULD NOT RECALL THE NAME OF THE MEDICATION)        
Please notify patient of Dr. Amin's response. I have also seen joint pain with Januvia.  I do not have this medication on his list so medication must have been started by his employer provider  
Regarding Januvia  
Walking

## 2025-04-04 DIAGNOSIS — E78.2 MIXED HYPERLIPIDEMIA: ICD-10-CM

## 2025-04-04 DIAGNOSIS — I10 ESSENTIAL HYPERTENSION: ICD-10-CM

## 2025-04-04 RX ORDER — SIMVASTATIN 40 MG
40 TABLET ORAL
Qty: 90 TABLET | Refills: 0 | Status: SHIPPED | OUTPATIENT
Start: 2025-04-04

## 2025-04-04 RX ORDER — LISINOPRIL 40 MG/1
40 TABLET ORAL DAILY
Qty: 90 TABLET | Refills: 0 | Status: SHIPPED | OUTPATIENT
Start: 2025-04-04

## 2025-04-04 RX ORDER — AMLODIPINE BESYLATE 10 MG/1
10 TABLET ORAL DAILY
Qty: 90 TABLET | Refills: 0 | Status: SHIPPED | OUTPATIENT
Start: 2025-04-04

## 2025-04-16 ENCOUNTER — OFFICE VISIT (OUTPATIENT)
Dept: FAMILY MEDICINE CLINIC | Facility: CLINIC | Age: 65
End: 2025-04-16
Payer: COMMERCIAL

## 2025-04-16 VITALS
OXYGEN SATURATION: 98 % | SYSTOLIC BLOOD PRESSURE: 128 MMHG | WEIGHT: 242 LBS | DIASTOLIC BLOOD PRESSURE: 80 MMHG | HEIGHT: 69 IN | HEART RATE: 58 BPM | BODY MASS INDEX: 35.84 KG/M2

## 2025-04-16 DIAGNOSIS — Z00.00 ANNUAL PHYSICAL EXAM: ICD-10-CM

## 2025-04-16 DIAGNOSIS — E78.2 MIXED HYPERLIPIDEMIA: ICD-10-CM

## 2025-04-16 DIAGNOSIS — K76.0 HEPATIC STEATOSIS: Primary | ICD-10-CM

## 2025-04-16 DIAGNOSIS — M25.521 RIGHT ELBOW PAIN: ICD-10-CM

## 2025-04-16 DIAGNOSIS — E11.65 TYPE 2 DIABETES MELLITUS WITH HYPERGLYCEMIA, WITHOUT LONG-TERM CURRENT USE OF INSULIN: ICD-10-CM

## 2025-04-16 DIAGNOSIS — I10 PRIMARY HYPERTENSION: ICD-10-CM

## 2025-04-16 DIAGNOSIS — M25.561 ACUTE PAIN OF RIGHT KNEE: ICD-10-CM

## 2025-04-16 PROCEDURE — 82570 ASSAY OF URINE CREATININE: CPT | Performed by: NURSE PRACTITIONER

## 2025-04-16 PROCEDURE — 82043 UR ALBUMIN QUANTITATIVE: CPT | Performed by: NURSE PRACTITIONER

## 2025-04-16 RX ORDER — ATORVASTATIN CALCIUM 40 MG/1
40 TABLET, FILM COATED ORAL NIGHTLY
Qty: 90 TABLET | Refills: 1 | Status: SHIPPED | OUTPATIENT
Start: 2025-04-16

## 2025-04-16 RX ORDER — ATORVASTATIN CALCIUM 40 MG/1
TABLET, FILM COATED ORAL EVERY 24 HOURS
COMMUNITY
End: 2025-04-16 | Stop reason: SDUPTHER

## 2025-04-16 RX ORDER — SITAGLIPTIN 100 MG/1
1 TABLET, FILM COATED ORAL DAILY
COMMUNITY
Start: 2025-01-28 | End: 2025-04-16 | Stop reason: SDUPTHER

## 2025-04-16 RX ORDER — SITAGLIPTIN 100 MG/1
100 TABLET, FILM COATED ORAL DAILY
Qty: 30 TABLET | Refills: 2 | Status: SHIPPED | OUTPATIENT
Start: 2025-04-16 | End: 2025-07-15

## 2025-04-16 NOTE — PROGRESS NOTES
"Chief Complaint  Annual Exam (Pt is taking both atorvastatin and simvastatin. He is not sure which one to be taking. ), Knee Pain (Slipped on the ice and twisted right knee. Now having a lot pain on the inside of the knee. Knee feels weak at times and painful ), and Elbow Pain (Right elbow pain )    Subjective        Thjohn Razo presents to [facility]  History of Present Illness    Patient presents for Annual Exam.  Past medical history includes hyperlipidemia, hypertension, type 2 diabetes, hepatic steatosis and BPH.    Hypertension stable on Lisinopril 40 mg daily and amlodipine 10 mg daily. Patient is taking Zocor 40 mg nightly for hyperlipidemia. Patient reports he is also taking Lipitor.     Patient is taking Metformin 1 gm BID and Januvia 100 mg daily for type II diabetes. A1C in January was 8.0. Patient reports seen by Ashley Torres through employer, seeing every 3 months.  He also reports he is working with a nutritionist.     Hepatic steatosis, followed by GI.     Patient reports he slipped on the ice, having pain to inside of right knee. He denies swelling but reports causing pain daily, worse after ambulation. Patient also describes some elbow pain that began after knee, started after doing some arm exercises in the gym.     Objective   Vital Signs:  /80 (BP Location: Left arm, Patient Position: Sitting, Cuff Size: Large Adult)   Pulse 58   Ht 175.3 cm (69\")   Wt 110 kg (242 lb)   SpO2 98%   BMI 35.74 kg/m²   Estimated body mass index is 35.74 kg/m² as calculated from the following:    Height as of this encounter: 175.3 cm (69\").    Weight as of this encounter: 110 kg (242 lb).    Class 2 Severe Obesity (BMI >=35 and <=39.9). Obesity-related health conditions include the following: hypertension, diabetes mellitus, and dyslipidemias. Obesity is unchanged. BMI is is above average; BMI management plan is completed. We discussed portion control, increasing exercise, " joining a fitness center or start home based exercise program, and management of depression/anxiety/stress to control compensatory eating.      Physical Exam  Constitutional:       Appearance: Normal appearance.   HENT:      Head: Normocephalic.      Right Ear: Tympanic membrane normal.      Left Ear: Tympanic membrane normal.      Mouth/Throat:      Pharynx: Oropharynx is clear.   Cardiovascular:      Rate and Rhythm: Normal rate and regular rhythm.   Pulmonary:      Effort: Pulmonary effort is normal.      Breath sounds: Normal breath sounds.   Abdominal:      General: Abdomen is flat. Bowel sounds are normal.      Palpations: Abdomen is soft.   Musculoskeletal:         General: Normal range of motion.      Cervical back: Neck supple.      Right lower leg: No edema.      Left lower leg: No edema.   Skin:     General: Skin is warm and dry.   Neurological:      Mental Status: He is alert and oriented to person, place, and time.      Gait: Gait is intact.   Psychiatric:         Attention and Perception: Attention normal.         Mood and Affect: Mood normal.         Speech: Speech normal.        Result Review :                Assessment and Plan   Diagnoses and all orders for this visit:    1. Hepatic steatosis (Primary)  Assessment & Plan:  Treatment liver enzymes were normal, follow-up with GI as instructed      2. Type 2 diabetes mellitus with hyperglycemia, without long-term current use of insulin  -     Microalbumin / Creatinine Urine Ratio - Urine, Clean Catch  -     Januvia 100 MG tablet; Take 1 tablet by mouth Daily for 90 days. Indications: Type 2 Diabetes  Dispense: 30 tablet; Refill: 2    3. Primary hypertension  Assessment & Plan:  Hypertension is stable and controlled  Continue current treatment regimen.  Regular aerobic exercise.  Ambulatory blood pressure monitoring.  Blood pressure will be reassessed in 6 months.      4. Mixed hyperlipidemia  Assessment & Plan:   Lipid abnormalities are improving with  treatment    Plan:  Continue same medication/s without change.  , Discontinue the following medication/s; Zocor.  , and Begin taking the following medication/s; Lipitor.      Discussed medication dosage, use, side effects, and goals of treatment in detail.    Counseled patient on lifestyle modifications to help control hyperlipidemia.   Advised patient to exercise for 150 minutes weekly. (30 minute brisk walk, 5 days a week for example)    Patient Treatment Goals:   LDL goal is under 100    Followup at the next regular appointment.    Orders:  -     atorvastatin (LIPITOR) 40 MG tablet; Take 1 tablet by mouth Every Night.  Dispense: 90 tablet; Refill: 1    5. Annual physical exam  Assessment & Plan:  Labs reviewed  Routine vision and dental screenings advised  Colon cancer screening up-to-date      6. Acute pain of right knee  -     XR Knee 3 View Bilateral; Future    7. Right elbow pain  Comments:  1. Exercises provided to patient  2. Call if sx persist or worsen           I spent 30 minutes caring for Tho on this date of service. This time includes time spent by me in the following activities:preparing for the visit, reviewing tests, obtaining and/or reviewing a separately obtained history, performing a medically appropriate examination and/or evaluation , counseling and educating the patient/family/caregiver, ordering medications, tests, or procedures, documenting information in the medical record, and care coordination  Follow Up   Return in about 6 months (around 10/16/2025) for DMII/HTN.  Patient was given instructions and counseling regarding his condition or for health maintenance advice. Please see specific information pulled into the AVS if appropriate.     Counseling was given to patient for the following topics: diagnostic results, instructions for management, risk factor reductions, prognosis, patient and family education, impressions, risks and benefits of treatment options, and importance of treatment  compliance . Total time of the encounter was 25 minutes and 5 minutes was spent counseling.

## 2025-04-16 NOTE — ASSESSMENT & PLAN NOTE
Lipid abnormalities are improving with treatment    Plan:  Continue same medication/s without change.  , Discontinue the following medication/s; Zocor.  , and Begin taking the following medication/s; Lipitor.      Discussed medication dosage, use, side effects, and goals of treatment in detail.    Counseled patient on lifestyle modifications to help control hyperlipidemia.   Advised patient to exercise for 150 minutes weekly. (30 minute brisk walk, 5 days a week for example)    Patient Treatment Goals:   LDL goal is under 100    Followup at the next regular appointment.

## 2025-04-17 LAB
ALBUMIN UR-MCNC: 1.9 MG/DL
CREAT UR-MCNC: 140.1 MG/DL
MICROALBUMIN/CREAT UR: 13.6 MG/G (ref 0–29)

## 2025-04-28 DIAGNOSIS — M25.561 ACUTE PAIN OF RIGHT KNEE: Primary | ICD-10-CM

## 2025-04-30 ENCOUNTER — TELEPHONE (OUTPATIENT)
Dept: FAMILY MEDICINE CLINIC | Facility: CLINIC | Age: 65
End: 2025-04-30
Payer: COMMERCIAL

## 2025-04-30 RX ORDER — MELOXICAM 7.5 MG/1
7.5 TABLET ORAL DAILY
Qty: 30 TABLET | Refills: 1 | Status: SHIPPED | OUTPATIENT
Start: 2025-04-30

## 2025-04-30 NOTE — TELEPHONE ENCOUNTER
He slipped on the ice back in January and he continues to have pain. He stated at his last appointment that it just does not seem to be getting better and it continues to bother him and he struggles to walk at times.

## 2025-04-30 NOTE — TELEPHONE ENCOUNTER
Patrica is requesting results for xray from lisa. Please see request from patient for something for the pain.

## 2025-04-30 NOTE — TELEPHONE ENCOUNTER
Caller: Michael Razo    Relationship: Self    Best call back number: [phone]    What medication are you requesting: PAIN MEDICATION FOR KNEE    What are your current symptoms: PAIN IN KNEE    How long have you been experiencing symptoms:     Have you had these symptoms before:      [x] Yes  [] No    Have you been treated for these symptoms before:     [x] Yes  [] No    If a prescription is needed, what is your preferred pharmacy and phone number: [id number] - [place], [place] - [address] - [phone]  - [phone] FX     Additional notes: PATIENT WOULD LIKE A CALLBACK FORM THE RESULTS OF HIS XRAY'S AND SOME MEDICATION FOR THE PAIN. PLEASE CALL TO CONFIRM OR DENY

## 2025-05-01 ENCOUNTER — TELEPHONE (OUTPATIENT)
Dept: FAMILY MEDICINE CLINIC | Facility: CLINIC | Age: 65
End: 2025-05-01
Payer: COMMERCIAL

## 2025-05-01 NOTE — TELEPHONE ENCOUNTER
Caller: Michael Razo    Relationship: Self    Best call back number: [phone]    Caller requesting test results: MICHAEL RAZO    What test was performed: X-RAYS    When was the test performed: [id number]    Where was the test performed: [place]    Additional notes: PATIENT IS REQUESTING A CALL WITH THE RESULTS OF THE ABOVE X-RAYS.    PLEASE ADVISE.

## 2025-05-05 ENCOUNTER — RESULTS FOLLOW-UP (OUTPATIENT)
Dept: FAMILY MEDICINE CLINIC | Facility: CLINIC | Age: 65
End: 2025-05-05
Payer: COMMERCIAL

## 2025-05-05 NOTE — PROGRESS NOTES
Please let patient know that the x-ray shows mild arthritis.  If the meloxicam does not help with pain, we can refer to Ortho for further evaluation.

## 2025-06-27 RX ORDER — MELOXICAM 7.5 MG/1
7.5 TABLET ORAL DAILY
Qty: 30 TABLET | Refills: 1 | Status: SHIPPED | OUTPATIENT
Start: 2025-06-27

## 2025-06-29 DIAGNOSIS — I10 ESSENTIAL HYPERTENSION: ICD-10-CM

## 2025-06-30 RX ORDER — AMLODIPINE BESYLATE 10 MG/1
10 TABLET ORAL DAILY
Qty: 90 TABLET | Refills: 0 | Status: SHIPPED | OUTPATIENT
Start: 2025-06-30

## 2025-06-30 RX ORDER — LISINOPRIL 40 MG/1
40 TABLET ORAL DAILY
Qty: 90 TABLET | Refills: 0 | Status: SHIPPED | OUTPATIENT
Start: 2025-06-30

## 2025-07-30 ENCOUNTER — RESULTS FOLLOW-UP (OUTPATIENT)
Dept: FAMILY MEDICINE CLINIC | Facility: CLINIC | Age: 65
End: 2025-07-30
Payer: COMMERCIAL

## 2025-07-30 NOTE — TELEPHONE ENCOUNTER
Continued Stay SW/CM Assessment/Plan of Care Note         Active Substitute Decision Maker (SDM)    There are no active Substitute Decision Maker (SDM) on file.       Progress note:  Cm received call from [facility]- patient OON.     CM called over to [facility] to see if patient would be in network. Provided with insurance information & rec for IV dulbavancin. Awaiting return call back. [facility] APN Dante updated.    1430: Notified by ID, [facility]. Anticipate that patient maybe medically cleared for dc. No additional dc CM needs identified. Pts spouse will provide transport home at time of dc.     Current Patient Status: Inpatient    See SW/CM flowsheets for other objective data.    Disposition Recommendations:  Preliminary discharge destination: Planned Discharge Destination: Home self-care  SW/CM recommendation for discharge: Home, Other (comment) (Per ID, needs one time dose Dalbavancin)    Discharge Plan/Needs:     Continued Care and Services - Admitted Since 7/27/2025    No active coordination exists for this encounter.         Prior To Hospitalization:    Living Situation: Spouse and residing at House    .  Support Systems: Family members, Spouse   Home Devices/Equipment: Blood pressure monitor            Mobility Assist Devices: None   Type of Service Prior to Hospitalization: None               Patient/Family discharge goal (s):  Home     Resources provided:           Prior Function                Current Function  Last Filed Values       None            Therapy Recommendations for Discharge:   PT:      Last Filed Values       None          OT:      Last Filed Values       None          SLP:    Last Filed Values       None            Mobility Equipment Recommended for Discharge:        Barriers to Discharge  Identified Barriers to Discharge/Transition Planning:   Clinical barriers: Medical necessity for acute care   External barriers:     Psychosocial barriers:   Tried to contact patient, no answer left vm about our office no longer having an educator and told would be cancelling the appt that is schedule. Also told that we will call back when the new educator is available and accepting pts.

## 2025-08-20 RX ORDER — MELOXICAM 7.5 MG/1
7.5 TABLET ORAL DAILY
Qty: 30 TABLET | Refills: 1 | Status: SHIPPED | OUTPATIENT
Start: 2025-08-20